# Patient Record
Sex: FEMALE | Race: WHITE | Employment: UNEMPLOYED
[De-identification: names, ages, dates, MRNs, and addresses within clinical notes are randomized per-mention and may not be internally consistent; named-entity substitution may affect disease eponyms.]

---

## 2017-03-01 ENCOUNTER — OFFICE VISIT (OUTPATIENT)
Dept: INTERNAL MEDICINE | Facility: CLINIC | Age: 72
End: 2017-03-01

## 2017-03-01 VITALS
DIASTOLIC BLOOD PRESSURE: 70 MMHG | WEIGHT: 197 LBS | BODY MASS INDEX: 38.68 KG/M2 | HEIGHT: 60 IN | SYSTOLIC BLOOD PRESSURE: 120 MMHG

## 2017-03-01 DIAGNOSIS — J01.10 ACUTE NON-RECURRENT FRONTAL SINUSITIS: Primary | ICD-10-CM

## 2017-03-01 PROCEDURE — 1123F ACP DISCUSS/DSCN MKR DOCD: CPT | Performed by: INTERNAL MEDICINE

## 2017-03-01 PROCEDURE — 3017F COLORECTAL CA SCREEN DOC REV: CPT | Performed by: INTERNAL MEDICINE

## 2017-03-01 PROCEDURE — 4040F PNEUMOC VAC/ADMIN/RCVD: CPT | Performed by: INTERNAL MEDICINE

## 2017-03-01 PROCEDURE — 99213 OFFICE O/P EST LOW 20 MIN: CPT | Performed by: INTERNAL MEDICINE

## 2017-03-01 PROCEDURE — 1036F TOBACCO NON-USER: CPT | Performed by: INTERNAL MEDICINE

## 2017-03-01 PROCEDURE — G8428 CUR MEDS NOT DOCUMENT: HCPCS | Performed by: INTERNAL MEDICINE

## 2017-03-01 PROCEDURE — 3014F SCREEN MAMMO DOC REV: CPT | Performed by: INTERNAL MEDICINE

## 2017-03-01 PROCEDURE — G8419 CALC BMI OUT NRM PARAM NOF/U: HCPCS | Performed by: INTERNAL MEDICINE

## 2017-03-01 PROCEDURE — G8399 PT W/DXA RESULTS DOCUMENT: HCPCS | Performed by: INTERNAL MEDICINE

## 2017-03-01 PROCEDURE — 1090F PRES/ABSN URINE INCON ASSESS: CPT | Performed by: INTERNAL MEDICINE

## 2017-03-01 PROCEDURE — G8484 FLU IMMUNIZE NO ADMIN: HCPCS | Performed by: INTERNAL MEDICINE

## 2017-03-01 RX ORDER — AZITHROMYCIN 250 MG/1
TABLET, FILM COATED ORAL
Qty: 8 TABLET | Refills: 0 | Status: SHIPPED | OUTPATIENT
Start: 2017-03-01 | End: 2017-12-05 | Stop reason: ALTCHOICE

## 2017-03-30 ENCOUNTER — HOSPITAL ENCOUNTER (OUTPATIENT)
Dept: WOMENS IMAGING | Age: 72
Discharge: HOME | End: 2017-03-30
Payer: MEDICARE

## 2017-03-30 DIAGNOSIS — Z12.31 VISIT FOR SCREENING MAMMOGRAM: ICD-10-CM

## 2017-03-30 PROCEDURE — 77063 BREAST TOMOSYNTHESIS BI: CPT

## 2017-05-30 ENCOUNTER — TELEPHONE (OUTPATIENT)
Dept: INTERNAL MEDICINE CLINIC | Age: 72
End: 2017-05-30

## 2017-05-30 ENCOUNTER — OFFICE VISIT (OUTPATIENT)
Dept: INTERNAL MEDICINE CLINIC | Age: 72
End: 2017-05-30
Payer: MEDICARE

## 2017-05-30 VITALS
DIASTOLIC BLOOD PRESSURE: 62 MMHG | RESPIRATION RATE: 14 BRPM | HEIGHT: 60 IN | WEIGHT: 193 LBS | SYSTOLIC BLOOD PRESSURE: 122 MMHG | BODY MASS INDEX: 37.89 KG/M2

## 2017-05-30 DIAGNOSIS — I11.9 HYPERTENSIVE HEART DISEASE WITHOUT HEART FAILURE: ICD-10-CM

## 2017-05-30 DIAGNOSIS — E55.9 VITAMIN D DEFICIENCY: ICD-10-CM

## 2017-05-30 DIAGNOSIS — M10.9 ACUTE GOUTY ARTHROPATHY: ICD-10-CM

## 2017-05-30 DIAGNOSIS — M17.0 PRIMARY OSTEOARTHRITIS OF BOTH KNEES: Primary | ICD-10-CM

## 2017-05-30 PROCEDURE — 1090F PRES/ABSN URINE INCON ASSESS: CPT | Performed by: INTERNAL MEDICINE

## 2017-05-30 PROCEDURE — G8417 CALC BMI ABV UP PARAM F/U: HCPCS | Performed by: INTERNAL MEDICINE

## 2017-05-30 PROCEDURE — G8427 DOCREV CUR MEDS BY ELIG CLIN: HCPCS | Performed by: INTERNAL MEDICINE

## 2017-05-30 PROCEDURE — 3014F SCREEN MAMMO DOC REV: CPT | Performed by: INTERNAL MEDICINE

## 2017-05-30 PROCEDURE — 4040F PNEUMOC VAC/ADMIN/RCVD: CPT | Performed by: INTERNAL MEDICINE

## 2017-05-30 PROCEDURE — 1036F TOBACCO NON-USER: CPT | Performed by: INTERNAL MEDICINE

## 2017-05-30 PROCEDURE — G8399 PT W/DXA RESULTS DOCUMENT: HCPCS | Performed by: INTERNAL MEDICINE

## 2017-05-30 PROCEDURE — 99214 OFFICE O/P EST MOD 30 MIN: CPT | Performed by: INTERNAL MEDICINE

## 2017-05-30 PROCEDURE — 3017F COLORECTAL CA SCREEN DOC REV: CPT | Performed by: INTERNAL MEDICINE

## 2017-05-30 PROCEDURE — 1123F ACP DISCUSS/DSCN MKR DOCD: CPT | Performed by: INTERNAL MEDICINE

## 2017-05-30 RX ORDER — MELOXICAM 7.5 MG/1
7.5 TABLET ORAL DAILY
Qty: 90 TABLET | Refills: 3 | Status: SHIPPED | OUTPATIENT
Start: 2017-05-30 | End: 2017-06-01 | Stop reason: ALTCHOICE

## 2017-05-30 RX ORDER — CLOTRIMAZOLE 1 %
CREAM (GRAM) TOPICAL
Qty: 100 G | Refills: 3 | Status: SHIPPED | OUTPATIENT
Start: 2017-05-30 | End: 2017-06-06

## 2017-05-30 RX ORDER — CELECOXIB 200 MG/1
200 CAPSULE ORAL DAILY
Qty: 90 CAPSULE | Refills: 3 | Status: SHIPPED | OUTPATIENT
Start: 2017-05-30 | End: 2017-05-30 | Stop reason: ALTCHOICE

## 2017-05-30 ASSESSMENT — PATIENT HEALTH QUESTIONNAIRE - PHQ9
SUM OF ALL RESPONSES TO PHQ9 QUESTIONS 1 & 2: 0
SUM OF ALL RESPONSES TO PHQ QUESTIONS 1-9: 0
1. LITTLE INTEREST OR PLEASURE IN DOING THINGS: 0
2. FEELING DOWN, DEPRESSED OR HOPELESS: 0

## 2017-05-30 ASSESSMENT — ENCOUNTER SYMPTOMS
COUGH: 0
CHEST TIGHTNESS: 0

## 2017-06-01 ENCOUNTER — TELEPHONE (OUTPATIENT)
Dept: INTERNAL MEDICINE CLINIC | Age: 72
End: 2017-06-01

## 2017-06-01 DIAGNOSIS — M17.0 PRIMARY OSTEOARTHRITIS OF BOTH KNEES: Primary | ICD-10-CM

## 2017-06-01 RX ORDER — MELOXICAM 7.5 MG/1
7.5 TABLET ORAL DAILY
Qty: 90 TABLET | Refills: 3 | Status: SHIPPED | OUTPATIENT
Start: 2017-06-01 | End: 2017-06-01 | Stop reason: SDUPTHER

## 2017-06-01 RX ORDER — MELOXICAM 7.5 MG
7.5 TABLET ORAL DAILY
Qty: 90 TABLET | Refills: 3 | Status: SHIPPED | OUTPATIENT
Start: 2017-06-01 | End: 2017-06-02 | Stop reason: SDUPTHER

## 2017-06-02 DIAGNOSIS — M17.0 PRIMARY OSTEOARTHRITIS OF BOTH KNEES: ICD-10-CM

## 2017-06-05 RX ORDER — MELOXICAM 7.5 MG
7.5 TABLET ORAL DAILY
Qty: 30 TABLET | Refills: 0 | Status: SHIPPED | OUTPATIENT
Start: 2017-06-05 | End: 2017-12-05 | Stop reason: ALTCHOICE

## 2017-09-14 RX ORDER — CELECOXIB 200 MG/1
CAPSULE ORAL
Qty: 30 CAPSULE | Refills: 10 | Status: SHIPPED | OUTPATIENT
Start: 2017-09-14 | End: 2018-06-11 | Stop reason: SDUPTHER

## 2017-12-05 ENCOUNTER — HOSPITAL ENCOUNTER (OUTPATIENT)
Facility: CLINIC | Age: 72
Discharge: HOME | End: 2017-12-05
Payer: MEDICARE

## 2017-12-05 ENCOUNTER — OFFICE VISIT (OUTPATIENT)
Dept: INTERNAL MEDICINE CLINIC | Age: 72
End: 2017-12-05
Payer: MEDICARE

## 2017-12-05 ENCOUNTER — HOSPITAL ENCOUNTER (OUTPATIENT)
Dept: GENERAL RADIOLOGY | Facility: CLINIC | Age: 72
Discharge: HOME | End: 2017-12-05
Payer: MEDICARE

## 2017-12-05 VITALS
SYSTOLIC BLOOD PRESSURE: 122 MMHG | WEIGHT: 192.9 LBS | HEIGHT: 60 IN | BODY MASS INDEX: 37.87 KG/M2 | DIASTOLIC BLOOD PRESSURE: 74 MMHG

## 2017-12-05 DIAGNOSIS — M54.6 ACUTE MIDLINE THORACIC BACK PAIN: ICD-10-CM

## 2017-12-05 DIAGNOSIS — Z12.11 COLON CANCER SCREENING: ICD-10-CM

## 2017-12-05 DIAGNOSIS — M54.6 ACUTE MIDLINE THORACIC BACK PAIN: Primary | ICD-10-CM

## 2017-12-05 PROCEDURE — G8399 PT W/DXA RESULTS DOCUMENT: HCPCS | Performed by: INTERNAL MEDICINE

## 2017-12-05 PROCEDURE — 1123F ACP DISCUSS/DSCN MKR DOCD: CPT | Performed by: INTERNAL MEDICINE

## 2017-12-05 PROCEDURE — 96372 THER/PROPH/DIAG INJ SC/IM: CPT | Performed by: INTERNAL MEDICINE

## 2017-12-05 PROCEDURE — G8417 CALC BMI ABV UP PARAM F/U: HCPCS | Performed by: INTERNAL MEDICINE

## 2017-12-05 PROCEDURE — 1090F PRES/ABSN URINE INCON ASSESS: CPT | Performed by: INTERNAL MEDICINE

## 2017-12-05 PROCEDURE — G8427 DOCREV CUR MEDS BY ELIG CLIN: HCPCS | Performed by: INTERNAL MEDICINE

## 2017-12-05 PROCEDURE — G8484 FLU IMMUNIZE NO ADMIN: HCPCS | Performed by: INTERNAL MEDICINE

## 2017-12-05 PROCEDURE — 99213 OFFICE O/P EST LOW 20 MIN: CPT | Performed by: INTERNAL MEDICINE

## 2017-12-05 PROCEDURE — 3014F SCREEN MAMMO DOC REV: CPT | Performed by: INTERNAL MEDICINE

## 2017-12-05 PROCEDURE — 1036F TOBACCO NON-USER: CPT | Performed by: INTERNAL MEDICINE

## 2017-12-05 PROCEDURE — 4040F PNEUMOC VAC/ADMIN/RCVD: CPT | Performed by: INTERNAL MEDICINE

## 2017-12-05 PROCEDURE — 3017F COLORECTAL CA SCREEN DOC REV: CPT | Performed by: INTERNAL MEDICINE

## 2017-12-05 PROCEDURE — 72072 X-RAY EXAM THORAC SPINE 3VWS: CPT

## 2017-12-05 RX ORDER — HYDROCODONE BITARTRATE AND ACETAMINOPHEN 5; 325 MG/1; MG/1
1 TABLET ORAL EVERY 8 HOURS PRN
Qty: 15 TABLET | Refills: 0 | Status: SHIPPED | OUTPATIENT
Start: 2017-12-05 | End: 2017-12-10

## 2017-12-05 NOTE — PROGRESS NOTES
Subjective:      Patient ID: Deann Gutierrez is a 81 y.o. female. Visit Information    Have you changed or started any medications since your last visit including any over-the-counter medicines, vitamins, or herbal medicines? no   Are you having any side effects from any of your medications? -  no  Have you stopped taking any of your medications? Is so, why? -  no    Have you seen any other physician or provider since your last visit? No  Have you had any other diagnostic tests since your last visit? No  Have you been seen in the emergency room and/or had an admission to a hospital since we last saw you? No  Have you had your routine dental cleaning in the past 6 months? no    Have you activated your [employer] account? If not, what are your barriers? Yes     Patient Care Team:  Manas France MD as PCP - Ara Amador MD as PCP - [facility] Attributed Provider    Medical History Review  Past Medical, Family, and Social History reviewed and does contribute to the patient presenting condition    Health Maintenance   Topic Date Due    Hepatitis C screen  1945    DTaP/Tdap/Td vaccine (1 - Tdap) 05/24/1966    Diabetes screen  05/24/1987    Zostavax vaccine  05/24/2007    Colon Cancer Screen FIT/FOBT  02/17/2013    Flu vaccine (1) 09/01/2017    Breast cancer screen  03/30/2019    Lipid screen  06/18/2020    DEXA (modify frequency per FRAX score)  Completed    Pneumococcal low/med risk  Completed       HPI     Chief Complaint   Patient presents with    Shoulder Pain     c/o right shoulder pain x 3 days.  pt states she \"overworked herself\"      Pt report pain upper thoracic area below scapula   Has been cooking for more than 20 people     Has point tenderness mid thoracic area     Review of Systems   Past Medical History:   Diagnosis Date    Acute gouty arthropathy     Arthritis     Body mass index 36.0-36.9, adult     CAD (coronary artery disease)     Hypertension     hx of    Other abnormal glucose     Other specified urticaria     Unspecified asthma(493.90)     Unspecified hypertensive heart disease without heart failure     Unspecified vitamin D deficiency     Wears glasses      Social History   Substance Use Topics    Smoking status: Never Smoker    Smokeless tobacco: Never Used    Alcohol use No       ROS  CVS no chest pain no sob no orthopnea  Resp no cough   General no weight loss no fatigue no fever        Objective:   Physical Exam     Blood pressure 122/74, height 4' 11.84\" (1.52 m), weight 192 lb 14.4 oz (87.5 kg), not currently breastfeeding. General Appearance NAD conversant  Neck FROM supple no JVD  Lungs normal effort Clear to auscultation  Cardio regular rhythm no murmur  Abdomen Soft nontender no HSM  Ext no edema no cyanosis no clubbing   Skin no rashes no ulcers  Neuro no focal deficits   Musculoskeletal mid thoracic  spinal tenderness no kyphosis       Assessment:      1. Acute midline thoracic back pain  HYDROcodone-acetaminophen (NORCO) 5-325 MG per tablet    XR THORACIC SPINE (2 VIEWS)    ketorolac (TORADOL) injection 30 mg   2.  Colon cancer screening  POCT Fecal Immunochemical Test (FIT)         Plan:      Musculoskeletal pain   Short course of opiates with NSAID

## 2018-03-05 ENCOUNTER — OFFICE VISIT (OUTPATIENT)
Dept: INTERNAL MEDICINE CLINIC | Age: 73
End: 2018-03-05
Payer: MEDICARE

## 2018-03-05 VITALS
SYSTOLIC BLOOD PRESSURE: 114 MMHG | WEIGHT: 190 LBS | BODY MASS INDEX: 37.3 KG/M2 | DIASTOLIC BLOOD PRESSURE: 66 MMHG | HEIGHT: 60 IN

## 2018-03-05 DIAGNOSIS — J06.9 URI, ACUTE: Primary | ICD-10-CM

## 2018-03-05 DIAGNOSIS — Z12.11 COLON CANCER SCREENING: ICD-10-CM

## 2018-03-05 PROCEDURE — 99213 OFFICE O/P EST LOW 20 MIN: CPT | Performed by: INTERNAL MEDICINE

## 2018-03-05 RX ORDER — PROMETHAZINE HYDROCHLORIDE AND CODEINE PHOSPHATE 6.25; 1 MG/5ML; MG/5ML
5 SYRUP ORAL 4 TIMES DAILY PRN
Qty: 80 ML | Refills: 0 | Status: SHIPPED | OUTPATIENT
Start: 2018-03-05 | End: 2018-03-10

## 2018-03-05 RX ORDER — AZITHROMYCIN 250 MG/1
TABLET, FILM COATED ORAL
Qty: 1 PACKET | Refills: 0 | Status: SHIPPED | OUTPATIENT
Start: 2018-03-05 | End: 2018-03-14 | Stop reason: ALTCHOICE

## 2018-03-05 NOTE — PROGRESS NOTES
Subjective:      Patient ID: Carmen Persaud is a 81 y.o. female. Chronic Disease Visit Information    BP Readings from Last 3 Encounters:   03/05/18 114/66   12/05/17 122/74   05/30/17 122/62          LDL Cholesterol (mg/dL)   Date Value   06/18/2015 162 (H)     HDL (mg/dL)   Date Value   06/18/2015 59     BUN (mg/dL)   Date Value   07/01/2016 22     CREATININE (mg/dL)   Date Value   07/01/2016 0.54     Glucose (mg/dL)   Date Value   07/01/2016 110 (H)   02/15/2012 112 (H)            Have you changed or started any medications since your last visit including any over-the-counter medicines, vitamins, or herbal medicines? no   Are you having any side effects from any of your medications? -  no  Have you stopped taking any of your medications? Is so, why? -  no    Have you seen any other physician or provider since your last visit? No  Have you had any other diagnostic tests since your last visit? No  Have you been seen in the emergency room and/or had an admission to a hospital since we last saw you? No  Have you had your annual diabetic retinal (eye) exam? No  Have you had your routine dental cleaning in the past 6 months? no    Have you activated your [employer] account? If not, what are your barriers?  Yes     Patient Care Team:  Dejuan Mae MD as PCP - General  Zhou Toney MD as PCP - S Attributed Provider         Medical History Review  Past Medical, Family, and Social History reviewed and does contribute to the patient presenting condition    Health Maintenance   Topic Date Due    Hepatitis C screen  1945    Diabetes screen  05/24/1987    Shingles Vaccine (1 of 2 - 2 Dose Series) 05/24/1997    Colon Cancer Screen FIT/FOBT  02/17/2013    DTaP/Tdap/Td vaccine (1 - Tdap) 12/13/2018 (Originally 5/24/1966)    Breast cancer screen  03/30/2019    Lipid screen  06/18/2020    DEXA (modify frequency per FRAX score)  Completed    Flu vaccine  Completed    Pneumococcal low/med risk  Completed HPI   Chief Complaint   Patient presents with    Cough     Productive cough, dry mouth, sore throat x 1 week. Cough dry sorethroat   No ear pain   Nasal congestion   Chills +   No fever     Review of Systems   Past Medical History:   Diagnosis Date    Acute gouty arthropathy     Arthritis     Body mass index 36.0-36.9, adult     CAD (coronary artery disease)     Hypertension     hx of    Other abnormal glucose     Other specified urticaria     Unspecified asthma(493.90)     Unspecified hypertensive heart disease without heart failure     Unspecified vitamin D deficiency     Wears glasses      Social History   Substance Use Topics    Smoking status: Never Smoker    Smokeless tobacco: Never Used    Alcohol use No       ROS  CVS no chest pain no sob no orthopnea  Resp + cough   General no weight loss no fatigue no fever        Objective:   Physical Exam    Blood pressure 114/66, height 4' 11.84\" (1.52 m), weight 190 lb (86.2 kg), not currently breastfeeding. General Appearance NAD conversant  Neck FROM supple no sinsu tenderness  + postnasal drip   Lungs normal effort Clear to auscultation  Cardio regular rhythm no murmur  Abdomen Soft nontender no HSM  Ext no edema no cyanosis no clubbing   Skin no rashes no ulcers  Neuro no focal deficits   Musculoskeletal no spinal tenderness no kyphosis     Assessment:      1. URI, acute  azithromycin (ZITHROMAX) 250 MG tablet    promethazine-codeine (PHENERGAN WITH CODEINE) 6.25-10 MG/5ML syrup   2.  Colon cancer screening  POCT Fecal Immunochemical Test (FIT)         Plan:

## 2018-03-14 ENCOUNTER — HOSPITAL ENCOUNTER (OUTPATIENT)
Age: 73
Setting detail: SPECIMEN
Discharge: HOME | End: 2018-03-14
Payer: MEDICARE

## 2018-03-14 ENCOUNTER — OFFICE VISIT (OUTPATIENT)
Dept: INTERNAL MEDICINE CLINIC | Age: 73
End: 2018-03-14
Payer: MEDICARE

## 2018-03-14 VITALS
DIASTOLIC BLOOD PRESSURE: 60 MMHG | HEART RATE: 66 BPM | SYSTOLIC BLOOD PRESSURE: 122 MMHG | OXYGEN SATURATION: 98 % | WEIGHT: 191 LBS | HEIGHT: 60 IN | BODY MASS INDEX: 37.5 KG/M2

## 2018-03-14 DIAGNOSIS — M94.9 DISORDER OF CARTILAGE: ICD-10-CM

## 2018-03-14 DIAGNOSIS — Z00.00 WELL ADULT EXAM: ICD-10-CM

## 2018-03-14 DIAGNOSIS — Z12.11 SCREENING FOR COLON CANCER: ICD-10-CM

## 2018-03-14 DIAGNOSIS — Z00.00 WELL ADULT EXAM: Primary | ICD-10-CM

## 2018-03-14 DIAGNOSIS — J01.00 ACUTE MAXILLARY SINUSITIS, RECURRENCE NOT SPECIFIED: ICD-10-CM

## 2018-03-14 LAB
-: ABNORMAL
ABSOLUTE EOS #: 0.33 K/UL (ref 0–0.44)
ABSOLUTE IMMATURE GRANULOCYTE: 0.05 K/UL (ref 0–0.3)
ABSOLUTE LYMPH #: 3.91 K/UL (ref 1.1–3.7)
ABSOLUTE MONO #: 0.69 K/UL (ref 0.1–1.2)
ALBUMIN SERPL-MCNC: 4.2 G/DL (ref 3.5–5.2)
ALBUMIN/GLOBULIN RATIO: 1.4 (ref 1–2.5)
ALP BLD-CCNC: 54 U/L (ref 35–104)
ALT SERPL-CCNC: 15 U/L (ref 5–33)
AMORPHOUS: ABNORMAL
ANION GAP SERPL CALCULATED.3IONS-SCNC: 12 MMOL/L (ref 9–17)
AST SERPL-CCNC: 20 U/L
BACTERIA: ABNORMAL
BASOPHILS # BLD: 1 % (ref 0–2)
BASOPHILS ABSOLUTE: 0.06 K/UL (ref 0–0.2)
BILIRUB SERPL-MCNC: 0.21 MG/DL (ref 0.3–1.2)
BILIRUBIN URINE: NEGATIVE
BUN BLDV-MCNC: 21 MG/DL (ref 8–23)
BUN/CREAT BLD: ABNORMAL (ref 9–20)
CALCIUM SERPL-MCNC: 9.3 MG/DL (ref 8.6–10.4)
CASTS UA: ABNORMAL /LPF (ref 0–8)
CHLORIDE BLD-SCNC: 98 MMOL/L (ref 98–107)
CO2: 28 MMOL/L (ref 20–31)
COLOR: YELLOW
CREAT SERPL-MCNC: 0.66 MG/DL (ref 0.5–0.9)
CRYSTALS, UA: ABNORMAL /HPF
DIFFERENTIAL TYPE: ABNORMAL
EOSINOPHILS RELATIVE PERCENT: 3 % (ref 1–4)
EPITHELIAL CELLS UA: ABNORMAL /HPF (ref 0–5)
GFR AFRICAN AMERICAN: >60 ML/MIN
GFR NON-AFRICAN AMERICAN: >60 ML/MIN
GFR SERPL CREATININE-BSD FRML MDRD: ABNORMAL ML/MIN/{1.73_M2}
GFR SERPL CREATININE-BSD FRML MDRD: ABNORMAL ML/MIN/{1.73_M2}
GLUCOSE BLD-MCNC: 109 MG/DL (ref 70–99)
GLUCOSE URINE: NEGATIVE
HCT VFR BLD CALC: 43.9 % (ref 36.3–47.1)
HEMOGLOBIN: 13.4 G/DL (ref 11.9–15.1)
IMMATURE GRANULOCYTES: 1 %
KETONES, URINE: NEGATIVE
LEUKOCYTE ESTERASE, URINE: ABNORMAL
LYMPHOCYTES # BLD: 37 % (ref 24–43)
MCH RBC QN AUTO: 26.2 PG (ref 25.2–33.5)
MCHC RBC AUTO-ENTMCNC: 30.5 G/DL (ref 28.4–34.8)
MCV RBC AUTO: 85.9 FL (ref 82.6–102.9)
MONOCYTES # BLD: 7 % (ref 3–12)
MUCUS: ABNORMAL
NITRITE, URINE: NEGATIVE
NRBC AUTOMATED: 0 PER 100 WBC
OTHER OBSERVATIONS UA: ABNORMAL
PDW BLD-RTO: 13.9 % (ref 11.8–14.4)
PH UA: 5 (ref 5–8)
PLATELET # BLD: 282 K/UL (ref 138–453)
PLATELET ESTIMATE: ABNORMAL
PMV BLD AUTO: 10.6 FL (ref 8.1–13.5)
POTASSIUM SERPL-SCNC: 4.2 MMOL/L (ref 3.7–5.3)
PROTEIN UA: NEGATIVE
RBC # BLD: 5.11 M/UL (ref 3.95–5.11)
RBC # BLD: ABNORMAL 10*6/UL
RBC UA: ABNORMAL /HPF (ref 0–4)
RENAL EPITHELIAL, UA: ABNORMAL /HPF
SEDIMENTATION RATE, ERYTHROCYTE: 18 MM (ref 0–20)
SEG NEUTROPHILS: 51 % (ref 36–65)
SEGMENTED NEUTROPHILS ABSOLUTE COUNT: 5.59 K/UL (ref 1.5–8.1)
SODIUM BLD-SCNC: 138 MMOL/L (ref 135–144)
SPECIFIC GRAVITY UA: 1.02 (ref 1–1.03)
TOTAL PROTEIN: 7.3 G/DL (ref 6.4–8.3)
TRICHOMONAS: ABNORMAL
TSH SERPL DL<=0.05 MIU/L-ACNC: 2.39 MIU/L (ref 0.3–5)
TURBIDITY: ABNORMAL
URINE HGB: NEGATIVE
UROBILINOGEN, URINE: NORMAL
VITAMIN B-12: 777 PG/ML (ref 232–1245)
VITAMIN D 25-HYDROXY: 49.3 NG/ML (ref 30–100)
WBC # BLD: 10.6 K/UL (ref 3.5–11.3)
WBC # BLD: ABNORMAL 10*3/UL
WBC UA: ABNORMAL /HPF (ref 0–5)
YEAST: ABNORMAL

## 2018-03-14 PROCEDURE — 3288F FALL RISK ASSESSMENT DOCD: CPT | Performed by: INTERNAL MEDICINE

## 2018-03-14 PROCEDURE — 99397 PER PM REEVAL EST PAT 65+ YR: CPT | Performed by: INTERNAL MEDICINE

## 2018-03-14 PROCEDURE — G8510 SCR DEP NEG, NO PLAN REQD: HCPCS | Performed by: INTERNAL MEDICINE

## 2018-03-14 RX ORDER — AZITHROMYCIN 250 MG/1
TABLET, FILM COATED ORAL
Qty: 1 PACKET | Refills: 0 | Status: SHIPPED | OUTPATIENT
Start: 2018-03-14 | End: 2018-10-24

## 2018-03-14 ASSESSMENT — LIFESTYLE VARIABLES: HOW OFTEN DO YOU HAVE A DRINK CONTAINING ALCOHOL: 0

## 2018-03-14 ASSESSMENT — ENCOUNTER SYMPTOMS
COUGH: 0
CHEST TIGHTNESS: 0

## 2018-03-14 ASSESSMENT — ANXIETY QUESTIONNAIRES: GAD7 TOTAL SCORE: 0

## 2018-03-14 ASSESSMENT — PATIENT HEALTH QUESTIONNAIRE - PHQ9: SUM OF ALL RESPONSES TO PHQ QUESTIONS 1-9: 0

## 2018-04-13 PROBLEM — Z00.00 WELL ADULT EXAM: Status: RESOLVED | Noted: 2018-03-14 | Resolved: 2018-04-13

## 2018-06-11 RX ORDER — CELECOXIB 200 MG/1
CAPSULE ORAL
Qty: 30 CAPSULE | Refills: 9 | Status: CANCELLED | OUTPATIENT
Start: 2018-06-11

## 2018-06-11 RX ORDER — CELECOXIB 200 MG/1
200 CAPSULE ORAL DAILY
Qty: 90 CAPSULE | Refills: 3 | Status: SHIPPED | OUTPATIENT
Start: 2018-06-11 | End: 2018-11-13 | Stop reason: CLARIF

## 2018-10-24 ENCOUNTER — TELEPHONE (OUTPATIENT)
Dept: PHARMACY | Facility: CLINIC | Age: 73
End: 2018-10-24

## 2018-10-24 DIAGNOSIS — Z79.899 ENCOUNTER FOR MEDICATION REVIEW: Primary | ICD-10-CM

## 2018-10-24 PROCEDURE — 1111F DSCHRG MED/CURRENT MED MERGE: CPT | Performed by: PHARMACIST

## 2018-10-24 RX ORDER — ACETAMINOPHEN 325 MG/1
650 TABLET ORAL EVERY 6 HOURS PRN
COMMUNITY
End: 2020-08-18 | Stop reason: ALTCHOICE

## 2018-10-24 RX ORDER — CLINDAMYCIN HYDROCHLORIDE 300 MG/1
1 CAPSULE ORAL 3 TIMES DAILY
COMMUNITY
Start: 2018-10-25 | End: 2018-11-02

## 2019-03-08 ENCOUNTER — TELEPHONE (OUTPATIENT)
Dept: INTERNAL MEDICINE CLINIC | Age: 74
End: 2019-03-08

## 2019-03-13 ENCOUNTER — OFFICE VISIT (OUTPATIENT)
Dept: FAMILY MEDICINE CLINIC | Age: 74
End: 2019-03-13
Payer: MEDICARE

## 2019-03-13 VITALS
RESPIRATION RATE: 16 BRPM | BODY MASS INDEX: 39.07 KG/M2 | SYSTOLIC BLOOD PRESSURE: 124 MMHG | DIASTOLIC BLOOD PRESSURE: 75 MMHG | HEART RATE: 70 BPM | OXYGEN SATURATION: 96 % | HEIGHT: 60 IN | TEMPERATURE: 97.8 F | WEIGHT: 199 LBS

## 2019-03-13 DIAGNOSIS — M19.90 ARTHRITIS: Primary | ICD-10-CM

## 2019-03-13 DIAGNOSIS — R60.9 EDEMA, UNSPECIFIED TYPE: ICD-10-CM

## 2019-03-13 PROCEDURE — 99213 OFFICE O/P EST LOW 20 MIN: CPT | Performed by: FAMILY MEDICINE

## 2019-03-13 PROCEDURE — G8510 SCR DEP NEG, NO PLAN REQD: HCPCS | Performed by: FAMILY MEDICINE

## 2019-03-13 RX ORDER — PREDNISONE 10 MG/1
10 TABLET ORAL DAILY
Qty: 10 TABLET | Refills: 0 | Status: SHIPPED | OUTPATIENT
Start: 2019-03-13 | End: 2019-03-23

## 2019-03-13 RX ORDER — CELECOXIB 200 MG/1
200 CAPSULE ORAL 2 TIMES DAILY
Qty: 180 CAPSULE | Refills: 1 | Status: SHIPPED | OUTPATIENT
Start: 2019-03-13 | End: 2020-08-18 | Stop reason: ALTCHOICE

## 2019-03-13 ASSESSMENT — PATIENT HEALTH QUESTIONNAIRE - PHQ9
1. LITTLE INTEREST OR PLEASURE IN DOING THINGS: 0
SUM OF ALL RESPONSES TO PHQ QUESTIONS 1-9: 0
SUM OF ALL RESPONSES TO PHQ9 QUESTIONS 1 & 2: 0
SUM OF ALL RESPONSES TO PHQ QUESTIONS 1-9: 0
2. FEELING DOWN, DEPRESSED OR HOPELESS: 0

## 2019-03-13 ASSESSMENT — ENCOUNTER SYMPTOMS
EYES NEGATIVE: 1
GASTROINTESTINAL NEGATIVE: 1
ALLERGIC/IMMUNOLOGIC NEGATIVE: 1
RESPIRATORY NEGATIVE: 1

## 2019-03-27 ENCOUNTER — HOSPITAL ENCOUNTER (OUTPATIENT)
Dept: WOMENS IMAGING | Age: 74
Discharge: HOME | End: 2019-03-29
Payer: MEDICARE

## 2019-03-27 DIAGNOSIS — Z12.31 SCREENING MAMMOGRAM, ENCOUNTER FOR: ICD-10-CM

## 2019-03-27 PROCEDURE — 77063 BREAST TOMOSYNTHESIS BI: CPT

## 2019-11-15 ENCOUNTER — HOSPITAL ENCOUNTER (OUTPATIENT)
Age: 74
Discharge: HOME | End: 2019-11-15
Payer: MEDICARE

## 2019-11-15 DIAGNOSIS — Z13.220 SCREENING, LIPID: ICD-10-CM

## 2019-11-15 DIAGNOSIS — Z13.0 SCREENING, ANEMIA, DEFICIENCY, IRON: ICD-10-CM

## 2019-11-15 DIAGNOSIS — Z13.1 SCREENING FOR DIABETES MELLITUS: ICD-10-CM

## 2019-11-15 LAB
ABSOLUTE EOS #: 0.3 K/UL (ref 0–0.4)
ABSOLUTE IMMATURE GRANULOCYTE: NORMAL K/UL (ref 0–0.3)
ABSOLUTE LYMPH #: 3.3 K/UL (ref 1–4.8)
ABSOLUTE MONO #: 0.5 K/UL (ref 0.1–1.3)
ALBUMIN SERPL-MCNC: 4.3 G/DL (ref 3.5–5.2)
ALBUMIN/GLOBULIN RATIO: ABNORMAL (ref 1–2.5)
ALP BLD-CCNC: 60 U/L (ref 35–104)
ALT SERPL-CCNC: 12 U/L (ref 5–33)
ANION GAP SERPL CALCULATED.3IONS-SCNC: 13 MMOL/L (ref 9–17)
AST SERPL-CCNC: 17 U/L
BASOPHILS # BLD: 1 % (ref 0–2)
BASOPHILS ABSOLUTE: 0.1 K/UL (ref 0–0.2)
BILIRUB SERPL-MCNC: 0.34 MG/DL (ref 0.3–1.2)
BUN BLDV-MCNC: 17 MG/DL (ref 8–23)
BUN/CREAT BLD: ABNORMAL (ref 9–20)
CALCIUM SERPL-MCNC: 9.9 MG/DL (ref 8.6–10.4)
CHLORIDE BLD-SCNC: 104 MMOL/L (ref 98–107)
CHOLESTEROL/HDL RATIO: 4.4
CHOLESTEROL: 243 MG/DL
CO2: 26 MMOL/L (ref 20–31)
CREAT SERPL-MCNC: 0.68 MG/DL (ref 0.5–0.9)
DIFFERENTIAL TYPE: NORMAL
EOSINOPHILS RELATIVE PERCENT: 4 % (ref 0–4)
GFR AFRICAN AMERICAN: >60 ML/MIN
GFR NON-AFRICAN AMERICAN: >60 ML/MIN
GFR SERPL CREATININE-BSD FRML MDRD: ABNORMAL ML/MIN/{1.73_M2}
GFR SERPL CREATININE-BSD FRML MDRD: ABNORMAL ML/MIN/{1.73_M2}
GLUCOSE BLD-MCNC: 101 MG/DL (ref 70–99)
HCT VFR BLD CALC: 40.9 % (ref 36–46)
HDLC SERPL-MCNC: 55 MG/DL
HEMOGLOBIN: 13.4 G/DL (ref 12–16)
IMMATURE GRANULOCYTES: NORMAL %
LDL CHOLESTEROL: 173 MG/DL (ref 0–130)
LYMPHOCYTES # BLD: 41 % (ref 24–44)
MCH RBC QN AUTO: 26.3 PG (ref 26–34)
MCHC RBC AUTO-ENTMCNC: 32.7 G/DL (ref 31–37)
MCV RBC AUTO: 80.5 FL (ref 80–100)
MONOCYTES # BLD: 6 % (ref 1–7)
NRBC AUTOMATED: NORMAL PER 100 WBC
PDW BLD-RTO: 14.8 % (ref 11.5–14.9)
PLATELET # BLD: 281 K/UL (ref 150–450)
PLATELET ESTIMATE: NORMAL
PMV BLD AUTO: 7.7 FL (ref 6–12)
POTASSIUM SERPL-SCNC: 4.5 MMOL/L (ref 3.7–5.3)
RBC # BLD: 5.08 M/UL (ref 4–5.2)
RBC # BLD: NORMAL 10*6/UL
SEG NEUTROPHILS: 48 % (ref 36–66)
SEGMENTED NEUTROPHILS ABSOLUTE COUNT: 4 K/UL (ref 1.3–9.1)
SODIUM BLD-SCNC: 143 MMOL/L (ref 135–144)
TOTAL PROTEIN: 7.7 G/DL (ref 6.4–8.3)
TRIGL SERPL-MCNC: 76 MG/DL
VLDLC SERPL CALC-MCNC: ABNORMAL MG/DL (ref 1–30)
WBC # BLD: 8.2 K/UL (ref 3.5–11)
WBC # BLD: NORMAL 10*3/UL

## 2019-11-15 PROCEDURE — 80053 COMPREHEN METABOLIC PANEL: CPT

## 2019-11-15 PROCEDURE — 36415 COLL VENOUS BLD VENIPUNCTURE: CPT

## 2019-11-15 PROCEDURE — 80061 LIPID PANEL: CPT

## 2019-11-15 PROCEDURE — 85025 COMPLETE CBC W/AUTO DIFF WBC: CPT

## 2020-08-25 ENCOUNTER — HOSPITAL ENCOUNTER (EMERGENCY)
Age: 75
Discharge: HOME | End: 2020-08-25
Attending: EMERGENCY MEDICINE
Payer: MEDICARE

## 2020-08-25 ENCOUNTER — APPOINTMENT (OUTPATIENT)
Dept: GENERAL RADIOLOGY | Age: 75
End: 2020-08-25
Payer: MEDICARE

## 2020-08-25 VITALS
BODY MASS INDEX: 35.88 KG/M2 | TEMPERATURE: 97.7 F | SYSTOLIC BLOOD PRESSURE: 158 MMHG | HEIGHT: 62 IN | DIASTOLIC BLOOD PRESSURE: 76 MMHG | OXYGEN SATURATION: 98 % | HEART RATE: 59 BPM | WEIGHT: 195 LBS | RESPIRATION RATE: 18 BRPM

## 2020-08-25 PROCEDURE — 73030 X-RAY EXAM OF SHOULDER: CPT

## 2020-08-25 PROCEDURE — 6370000000 HC RX 637 (ALT 250 FOR IP): Performed by: EMERGENCY MEDICINE

## 2020-08-25 PROCEDURE — 99283 EMERGENCY DEPT VISIT LOW MDM: CPT

## 2020-08-25 PROCEDURE — 71046 X-RAY EXAM CHEST 2 VIEWS: CPT

## 2020-08-25 RX ORDER — TIZANIDINE 2 MG/1
2 TABLET ORAL EVERY 6 HOURS PRN
Qty: 30 TABLET | Refills: 0 | Status: SHIPPED | OUTPATIENT
Start: 2020-08-25 | End: 2020-09-24

## 2020-08-25 RX ORDER — CYCLOBENZAPRINE HCL 10 MG
10 TABLET ORAL ONCE
Status: COMPLETED | OUTPATIENT
Start: 2020-08-25 | End: 2020-08-25

## 2020-08-25 RX ORDER — LIDOCAINE 50 MG/G
1 PATCH TOPICAL DAILY
Qty: 7 PATCH | Refills: 0 | Status: SHIPPED | OUTPATIENT
Start: 2020-08-25 | End: 2020-09-01

## 2020-08-25 RX ADMIN — CYCLOBENZAPRINE 10 MG: 10 TABLET, FILM COATED ORAL at 09:26

## 2020-08-25 ASSESSMENT — ENCOUNTER SYMPTOMS
BLOOD IN STOOL: 0
NAUSEA: 0
ABDOMINAL PAIN: 0
SHORTNESS OF BREATH: 0
VOMITING: 0
BACK PAIN: 0
COLOR CHANGE: 0
DIARRHEA: 0
TROUBLE SWALLOWING: 0
CONSTIPATION: 0
COUGH: 0
SORE THROAT: 0

## 2020-08-25 ASSESSMENT — PAIN SCALES - GENERAL: PAINLEVEL_OUTOF10: 8

## 2020-08-25 ASSESSMENT — PAIN DESCRIPTION - LOCATION: LOCATION: BACK;SHOULDER

## 2020-08-25 ASSESSMENT — PAIN DESCRIPTION - PAIN TYPE: TYPE: ACUTE PAIN

## 2020-08-25 ASSESSMENT — PAIN DESCRIPTION - ORIENTATION: ORIENTATION: RIGHT

## 2020-08-25 NOTE — ED PROVIDER NOTES
[address] ED  eMERGENCY dEPARTMENT eNCOUnter    Pt Name: Deysi Koehler  MRN: [id number]  YOB: 1945  Date of evaluation:8/25/20  PCP: Sam Freeman MD    CHIEF COMPLAINT       Chief Complaint   Patient presents with    Shoulder Pain    Back Pain       HISTORY OF PRESENT ILLNESS    Aster Sequeira is a 70 y.o. female who presents with a chief complaint of posterior right shoulder and right upper back pain. Patient states her pain is been going on for the past 7 or 8 days. States she saw her PCP and was told that she has arthritis. States she has had remote imaging of the shoulder and was told that there was arthritis. She has been taking Mobic with minimal relief. Denies any chest pain. Denies any neck pain. No numbness, tingling, weakness in her extremities. No recent fevers, chills other illnesses. No shortness of breath, cough. Denies any falls or trauma. Symptoms are acute on chronic. Symptoms are moderate. Nothing make symptoms better or worse. Patient has no other complaints at this time. REVIEW OF SYSTEMS       Review of Systems   Constitutional: Negative for chills, fatigue and fever. HENT: Negative for congestion, ear pain, sore throat and trouble swallowing. Eyes: Negative for visual disturbance. Respiratory: Negative for cough and shortness of breath. Cardiovascular: Negative for chest pain, palpitations and leg swelling. Gastrointestinal: Negative for abdominal pain, blood in stool, constipation, diarrhea, nausea and vomiting. Genitourinary: Negative for dysuria and flank pain. Musculoskeletal: Positive for arthralgias. Negative for back pain, myalgias and neck pain. Skin: Negative for color change, rash and wound. Neurological: Negative for dizziness, weakness, light-headedness, numbness and headaches. Psychiatric/Behavioral: Negative for confusion. All other systems reviewed and are negative.     Negativein 10 essential Systems except as mentioned above and in the HPI. PAST MEDICAL HISTORY     Past Medical History:   Diagnosis Date    Acute gouty arthropathy     Arthritis     Body mass index 36.0-36.9, adult     CAD (coronary artery disease)     Hypertension     hx of    Other abnormal glucose     Other specified urticaria     Unspecified asthma(493.90)     Unspecified hypertensive heart disease without heart failure     Unspecified vitamin D deficiency     Wears glasses          SURGICAL HISTORY      has a past surgical history that includes Cardiac catheterization; Bunionectomy (Right); tumor excision; Shoulder arthroscopy (Right, 2016); Shoulder arthroscopy (Right, 2016); and Hysterectomy (10/02/2018). CURRENT MEDICATIONS       Previous Medications    CALCIUM CARBONATE-VITAMIN D (CALTRATE 600+D PO)    Take 1 tablet by mouth 2 times daily    MELOXICAM (MOBIC) 7.5 MG TABLET    Take 7.5 mg by mouth daily    TRIAMCINOLONE (KENALOG) 0.025 % CREAM    APPLY TOPICALLY DAILY       ALLERGIES     is allergic to aspirin; norvasc [amlodipine besylate]; penicillins; and vioxx [rofecoxib]. FAMILY HISTORY     She indicated that her mother is . She indicated that her father is . family history includes Cancer in her father and mother. SOCIAL HISTORY      reports that she has never smoked. She has never used smokeless tobacco. She reports that she does not drink alcohol or use drugs. PHYSICAL EXAM     INITIAL VITALS:  height is 5' 2\" (1.575 m) and weight is 195 lb (88.5 kg). Her oral temperature is 97.7 °F (36.5 °C). Her blood pressure is 158/76 (abnormal) and her pulse is 59. Her respiration is 18 and oxygen saturation is 98%. Physical Exam  Vitals signs and nursing note reviewed. Constitutional:       General: She is not in acute distress. HENT:      Head: Normocephalic and atraumatic.    Eyes:      Conjunctiva/sclera: Conjunctivae normal.      Pupils: Pupils are equal, round, and reactive to

## 2020-08-25 NOTE — ED NOTES
Mode of arrival (squad #, walk in, police, etc) : Pt. Walked into ED        Chief complaint(s): Shoulder pain        Arrival Note (brief scenario, treatment PTA, etc).: Pt. States she has shoulder pain that began 7- 10 days ago. Pt. States bending down and lifting things up makes the pain worse. Pt. States the pain awakes her at night. Pt. states she went to to her dr. 7 days ago and the medications she was given have not helped. Pt. States she is still having pain so she come to the ED. Pt. Is alert and oriented resting on cot in room, no signs of distress. C= \"Have you ever felt that you should Cut down on your drinking? \"  No  A= \"Have people Annoyed you by criticizing your drinking? \"  No  G= \"Have you ever felt bad or Guilty about your drinking? \"  No  E= \"Have you ever had a drink as an Eye-opener first thing in the morning to steady your nerves or to help a hangover? \"  No      Deferred []      Reason for deferring: N/A    *If yes to two or more: probable alcohol abuse. Rox Quan RN  08/25/20 [id number]

## 2020-09-10 ENCOUNTER — OFFICE VISIT (OUTPATIENT)
Dept: PODIATRY | Age: 75
End: 2020-09-10
Payer: MEDICARE

## 2020-09-10 VITALS — HEIGHT: 62 IN | WEIGHT: 195 LBS | BODY MASS INDEX: 35.88 KG/M2

## 2020-09-10 PROCEDURE — 99203 OFFICE O/P NEW LOW 30 MIN: CPT | Performed by: PODIATRIST

## 2020-09-10 PROCEDURE — 11720 DEBRIDE NAIL 1-5: CPT | Performed by: PODIATRIST

## 2020-09-10 ASSESSMENT — ENCOUNTER SYMPTOMS
BACK PAIN: 0
DIARRHEA: 0
SHORTNESS OF BREATH: 0
NAUSEA: 0
COLOR CHANGE: 0

## 2020-09-10 NOTE — PROGRESS NOTES
Lisa Dailey is a 70 y.o. female who presents to the office today with chief complaint of pain to the left great toenail. Chief Complaint   Patient presents with    Nail Problem     left hallux   Symptoms began greater than 1 week(s) ago. Patient denies injury to the left foot. Patient states that the nail is painful with pressure. Pain is rated 8 out of 10 at it's worst and is described as intermittent. Treatments prior to today's visit include: None. Allergies   Allergen Reactions    Aspirin     Norvasc [Amlodipine Besylate]     Penicillins     Vioxx [Rofecoxib]        Past Medical History:   Diagnosis Date    Acute gouty arthropathy     Arthritis     Body mass index 36.0-36.9, adult     CAD (coronary artery disease)     Hypertension     hx of    Other abnormal glucose     Other specified urticaria     Unspecified asthma(493.90)     Unspecified hypertensive heart disease without heart failure     Unspecified vitamin D deficiency     Wears glasses        Prior to Admission medications    Medication Sig Start Date End Date Taking? Authorizing Provider   meloxicam (MOBIC) 7.5 MG tablet Take 7.5 mg by mouth daily   Yes Historical Provider, MD   Calcium Carbonate-Vitamin D (CALTRATE 600+D PO) Take 1 tablet by mouth 2 times daily   Yes Historical Provider, MD   tiZANidine (ZANAFLEX) 2 MG tablet Take 1 tablet by mouth every 6 hours as needed (muscle spasm)  Patient not taking: Reported on 9/10/2020 8/25/20 9/24/20  Carol Clark DO   triamcinolone (KENALOG) 0.025 % cream APPLY TOPICALLY DAILY  Patient not taking: Reported on 9/10/2020 4/16/20   Sweetie Ernst MD       Past Surgical History:   Procedure Laterality Date    BUNIONECTOMY Right     CARDIAC CATHETERIZATION      collagen sponge femoral placed and absorbed    HYSTERECTOMY  10/02/2018    SHOULDER ARTHROSCOPY Right 07/14/2016    Acromioplasty, SAD, Mini-open rotator cuff repair.     SHOULDER ARTHROSCOPY Right 07/14/2016    TUMOR EXCISION      fatty tumor left mid back       Family History   Problem Relation Age of Onset    Cancer Mother     Cancer Father        Social History     Tobacco Use    Smoking status: Never Smoker    Smokeless tobacco: Never Used   Substance Use Topics    Alcohol use: No       Review of Systems   Constitutional: Negative for activity change, appetite change, chills, diaphoresis, fatigue and fever. Respiratory: Negative for shortness of breath. Cardiovascular: Negative for leg swelling. Gastrointestinal: Negative for diarrhea and nausea. Endocrine: Negative for cold intolerance, heat intolerance and polyuria. Musculoskeletal: Positive for arthralgias. Negative for back pain, gait problem, joint swelling and myalgias. Skin: Negative for color change, pallor, rash and wound. Allergic/Immunologic: Negative for environmental allergies and food allergies. Neurological: Negative for dizziness, weakness, light-headedness and numbness. Hematological: Does not bruise/bleed easily. Psychiatric/Behavioral: Negative for behavioral problems, confusion and self-injury. The patient is not nervous/anxious. Vitals: There were no vitals filed for this visit. General: AAO x 3 in NAD. Integument: There are no rashes, ulcers, or breaks in the skin noted to the bilateral lower extremities. There is no induration, subcutaneous nodules, or tightening of the skin noted to the bilateral.     Toenails 1-5 of the right foot do present with thickness, discoloration, brittleness, subungual debris. Toenails 1-5 of the left foot do present with thickness, discoloration, brittleness, subungual debris. There is incurvation noted to the lateral border of the left hallux toenail. There is erythema and edema noted to this area of the left hallux. There is no calor, drainage, or malodor noted to the left hallux. There is pain with palpation and debridement of this toenail.     Interdigital maceration absent to web spaces 1-4, Bilateral.     There are no preulcerative lesions noted to the right foot. There are no preulcerative lesions noted to the left foot. The skin to the bilateral feet is not thin and shiny. The skin to the bilateral feet is  warm, supple, and dry. Vascular: DP pulse of the right foot is  palpable. DP pulse of the left foot is  palpable. PT pulse of the right foot is  palpable. PT pulse of the left foot is  palpable. CFT is less than 3 secs to the digits of the right foot. CFT is less than 3 secs to the digits of the left foot. There is edema noted to the left foot. There is hair growth noted to the digits of the bilateral feet. There are no varicosities noted to the right foot/ankle. There are no varicosities noted to the left foot/ankle. Erythema is present to the left feet. Neurological: Reflexes are present to the right plantar foot and to the Achilles tendon. Reflexes are present to the left plantar foot and to the Achilles tendon. Epicritic sensation is  intact to the right foot. Epicritic sensation is  intact to the left foot. Musculoskeletal:  Muscle strength is +5/5 to all four muscle groups of the right lower extremity and +5/5 to all four muscle groups of the left lower extremity. There are no areas of subluxation, dislocation, or laxity noted to either lower extremity. Range of motion to the right ankle is  free of pain or grinding. Range of motion to the left ankle is  free of pain or grinding. Range of motion to the right subtalar joint is  free of pain or grinding. Range of motion to the left subtalar joint is  free of pain or grinding. No abnormalities, asymmetries, or misalignments are seen between the extremities.     Weightbearing evaluation does not reveal rearfoot eversion, medial prominence of the talar head, loss of the medial longitudinal arch height, and too many toes sign bilaterally. The digits of the right foot are not contracted. The digits of the left foot are not contracted. There is no prominence noted to the first metatarsal head without abduction of the hallux of the right foot. There is no prominence noted to the first metatarsal head without abduction of the hallux of the left foot. Shoe examination was performed. Biomechanical Exam: normal bilaterally. Asessment: Patient is a 70 y.o. female with:    Diagnosis Orders   1. OC (onychocryptosis)  CA DEBRIDEMENT OF NAIL(S), 1-5   2. Onychomycosis of toenail  CA DEBRIDEMENT OF NAIL(S), 1-5   3. Pain in toe of left foot  CA DEBRIDEMENT OF NAIL(S), 1-5       Plan:  1. Clinical evaluation of the patient. 2. The left hallux toenail was debrided with nail nippers and a  in a slant back fashion. The left hallux was then dressed with antibiotic ointment and a band aid. Patient to change this dressing daily until the wound heals. I advised the patient to return in 9 weeks for nails, but she declines. 3. Contact office with any questions/problems/concerns. Return if symptoms worsen or fail to improve.    9/10/2020      Ivanna Locke DPM

## 2020-10-21 ENCOUNTER — HOSPITAL ENCOUNTER (OUTPATIENT)
Dept: WOMENS IMAGING | Age: 75
Discharge: HOME | End: 2020-10-23
Payer: MEDICARE

## 2020-10-21 ENCOUNTER — HOSPITAL ENCOUNTER (OUTPATIENT)
Age: 75
Discharge: HOME | End: 2020-10-21
Payer: MEDICARE

## 2020-10-21 LAB
ALBUMIN SERPL-MCNC: 4.2 G/DL (ref 3.5–5.2)
ALBUMIN/GLOBULIN RATIO: ABNORMAL (ref 1–2.5)
ALP BLD-CCNC: 50 U/L (ref 35–104)
ALT SERPL-CCNC: 13 U/L (ref 5–33)
ANION GAP SERPL CALCULATED.3IONS-SCNC: 11 MMOL/L (ref 9–17)
AST SERPL-CCNC: 17 U/L
BILIRUB SERPL-MCNC: 0.32 MG/DL (ref 0.3–1.2)
BUN BLDV-MCNC: 18 MG/DL (ref 8–23)
BUN/CREAT BLD: ABNORMAL (ref 9–20)
CALCIUM SERPL-MCNC: 9.5 MG/DL (ref 8.6–10.4)
CHLORIDE BLD-SCNC: 104 MMOL/L (ref 98–107)
CO2: 24 MMOL/L (ref 20–31)
CREAT SERPL-MCNC: 0.65 MG/DL (ref 0.5–0.9)
GFR AFRICAN AMERICAN: >60 ML/MIN
GFR NON-AFRICAN AMERICAN: >60 ML/MIN
GFR SERPL CREATININE-BSD FRML MDRD: ABNORMAL ML/MIN/{1.73_M2}
GFR SERPL CREATININE-BSD FRML MDRD: ABNORMAL ML/MIN/{1.73_M2}
GLUCOSE BLD-MCNC: 104 MG/DL (ref 70–99)
HCT VFR BLD CALC: 40.9 % (ref 36–46)
HEMOGLOBIN: 13.4 G/DL (ref 12–16)
MCH RBC QN AUTO: 27 PG (ref 26–34)
MCHC RBC AUTO-ENTMCNC: 32.8 G/DL (ref 31–37)
MCV RBC AUTO: 82.4 FL (ref 80–100)
NRBC AUTOMATED: NORMAL PER 100 WBC
PDW BLD-RTO: 14.9 % (ref 11.5–14.9)
PLATELET # BLD: 268 K/UL (ref 150–450)
PMV BLD AUTO: 8.1 FL (ref 6–12)
POTASSIUM SERPL-SCNC: 4.2 MMOL/L (ref 3.7–5.3)
RBC # BLD: 4.96 M/UL (ref 4–5.2)
SODIUM BLD-SCNC: 139 MMOL/L (ref 135–144)
TOTAL PROTEIN: 7.2 G/DL (ref 6.4–8.3)
VITAMIN D 25-HYDROXY: 44.7 NG/ML (ref 30–100)
WBC # BLD: 8.4 K/UL (ref 3.5–11)

## 2020-10-21 PROCEDURE — 36415 COLL VENOUS BLD VENIPUNCTURE: CPT

## 2020-10-21 PROCEDURE — 85027 COMPLETE CBC AUTOMATED: CPT

## 2020-10-21 PROCEDURE — 77063 BREAST TOMOSYNTHESIS BI: CPT

## 2020-10-21 PROCEDURE — 82306 VITAMIN D 25 HYDROXY: CPT

## 2020-10-21 PROCEDURE — 80053 COMPREHEN METABOLIC PANEL: CPT

## 2020-10-26 ENCOUNTER — HOSPITAL ENCOUNTER (OUTPATIENT)
Dept: WOMENS IMAGING | Age: 75
Discharge: HOME | End: 2020-10-28
Payer: MEDICARE

## 2020-10-26 PROCEDURE — 77080 DXA BONE DENSITY AXIAL: CPT

## 2021-02-02 ENCOUNTER — APPOINTMENT (OUTPATIENT)
Dept: GENERAL RADIOLOGY | Age: 76
End: 2021-02-02
Payer: MEDICARE

## 2021-02-02 ENCOUNTER — HOSPITAL ENCOUNTER (EMERGENCY)
Age: 76
Discharge: HOME | End: 2021-02-02
Attending: EMERGENCY MEDICINE
Payer: MEDICARE

## 2021-02-02 ENCOUNTER — APPOINTMENT (OUTPATIENT)
Dept: CT IMAGING | Age: 76
End: 2021-02-02
Payer: MEDICARE

## 2021-02-02 VITALS
SYSTOLIC BLOOD PRESSURE: 109 MMHG | HEIGHT: 59 IN | OXYGEN SATURATION: 93 % | BODY MASS INDEX: 38.1 KG/M2 | RESPIRATION RATE: 20 BRPM | HEART RATE: 72 BPM | TEMPERATURE: 97.9 F | WEIGHT: 189 LBS | DIASTOLIC BLOOD PRESSURE: 74 MMHG

## 2021-02-02 DIAGNOSIS — J40 BRONCHITIS: Primary | ICD-10-CM

## 2021-02-02 LAB
ABSOLUTE EOS #: 0.6 K/UL (ref 0–0.4)
ABSOLUTE IMMATURE GRANULOCYTE: ABNORMAL K/UL (ref 0–0.3)
ABSOLUTE LYMPH #: 3.1 K/UL (ref 1–4.8)
ABSOLUTE MONO #: 0.6 K/UL (ref 0.1–1.3)
ALBUMIN SERPL-MCNC: 3.8 G/DL (ref 3.5–5.2)
ALBUMIN/GLOBULIN RATIO: ABNORMAL (ref 1–2.5)
ALLEN TEST: ABNORMAL
ALP BLD-CCNC: 55 U/L (ref 35–104)
ALT SERPL-CCNC: 21 U/L (ref 5–33)
ANION GAP SERPL CALCULATED.3IONS-SCNC: 11 MMOL/L (ref 9–17)
AST SERPL-CCNC: 22 U/L
BASOPHILS # BLD: 1 % (ref 0–2)
BASOPHILS ABSOLUTE: 0.1 K/UL (ref 0–0.2)
BILIRUB SERPL-MCNC: 0.16 MG/DL (ref 0.3–1.2)
BNP INTERPRETATION: NORMAL
BUN BLDV-MCNC: 20 MG/DL (ref 8–23)
BUN/CREAT BLD: ABNORMAL (ref 9–20)
CALCIUM SERPL-MCNC: 8.9 MG/DL (ref 8.6–10.4)
CARBOXYHEMOGLOBIN: 2.4 % (ref 0–5)
CHLORIDE BLD-SCNC: 103 MMOL/L (ref 98–107)
CO2: 26 MMOL/L (ref 20–31)
CREAT SERPL-MCNC: 0.84 MG/DL (ref 0.5–0.9)
DIFFERENTIAL TYPE: ABNORMAL
DIRECT EXAM: NORMAL
EOSINOPHILS RELATIVE PERCENT: 7 % (ref 0–4)
FIO2: ABNORMAL
GFR AFRICAN AMERICAN: >60 ML/MIN
GFR NON-AFRICAN AMERICAN: >60 ML/MIN
GFR SERPL CREATININE-BSD FRML MDRD: ABNORMAL ML/MIN/{1.73_M2}
GFR SERPL CREATININE-BSD FRML MDRD: ABNORMAL ML/MIN/{1.73_M2}
GLUCOSE BLD-MCNC: 117 MG/DL (ref 70–99)
HCO3 VENOUS: 28.1 MMOL/L (ref 24–30)
HCT VFR BLD CALC: 39.3 % (ref 36–46)
HEMOGLOBIN: 12.9 G/DL (ref 12–16)
IMMATURE GRANULOCYTES: ABNORMAL %
LYMPHOCYTES # BLD: 35 % (ref 24–44)
Lab: NORMAL
MCH RBC QN AUTO: 26.6 PG (ref 26–34)
MCHC RBC AUTO-ENTMCNC: 32.9 G/DL (ref 31–37)
MCV RBC AUTO: 80.9 FL (ref 80–100)
METHEMOGLOBIN: 0.2 % (ref 0–1.9)
MODE: ABNORMAL
MONOCYTES # BLD: 6 % (ref 1–7)
NEGATIVE BASE EXCESS, VEN: ABNORMAL MMOL/L (ref 0–2)
NOTIFICATION TIME: ABNORMAL
NOTIFICATION: ABNORMAL
NRBC AUTOMATED: ABNORMAL PER 100 WBC
O2 DEVICE/FLOW/%: ABNORMAL
O2 SAT, VEN: 93.8 % (ref 60–85)
OXYHEMOGLOBIN: ABNORMAL % (ref 95–98)
PATIENT TEMP: 37
PCO2, VEN, TEMP ADJ: ABNORMAL MMHG (ref 39–55)
PCO2, VEN: 44.9 (ref 39–55)
PDW BLD-RTO: 14.7 % (ref 11.5–14.9)
PEEP/CPAP: ABNORMAL
PH VENOUS: 7.41 (ref 7.32–7.42)
PH, VEN, TEMP ADJ: ABNORMAL (ref 7.32–7.42)
PLATELET # BLD: 238 K/UL (ref 150–450)
PLATELET ESTIMATE: ABNORMAL
PMV BLD AUTO: 8.4 FL (ref 6–12)
PO2, VEN, TEMP ADJ: ABNORMAL MMHG (ref 30–50)
PO2, VEN: 78 (ref 30–50)
POSITIVE BASE EXCESS, VEN: 3.4 MMOL/L (ref 0–2)
POTASSIUM SERPL-SCNC: 4.2 MMOL/L (ref 3.7–5.3)
PRO-BNP: 57 PG/ML
PSV: ABNORMAL
PT. POSITION: ABNORMAL
RBC # BLD: 4.86 M/UL (ref 4–5.2)
RBC # BLD: ABNORMAL 10*6/UL
RESPIRATORY RATE: ABNORMAL
SAMPLE SITE: ABNORMAL
SARS-COV-2, RAPID: NOT DETECTED
SARS-COV-2: NORMAL
SARS-COV-2: NORMAL
SEG NEUTROPHILS: 51 % (ref 36–66)
SEGMENTED NEUTROPHILS ABSOLUTE COUNT: 4.6 K/UL (ref 1.3–9.1)
SET RATE: ABNORMAL
SODIUM BLD-SCNC: 140 MMOL/L (ref 135–144)
SOURCE: NORMAL
SPECIMEN DESCRIPTION: NORMAL
TEXT FOR RESPIRATORY: ABNORMAL
TOTAL HB: ABNORMAL G/DL (ref 12–16)
TOTAL PROTEIN: 6.6 G/DL (ref 6.4–8.3)
TOTAL RATE: ABNORMAL
TROPONIN INTERP: NORMAL
TROPONIN T: NORMAL NG/ML
TROPONIN, HIGH SENSITIVITY: 10 NG/L (ref 0–14)
VT: ABNORMAL
WBC # BLD: 9 K/UL (ref 3.5–11)
WBC # BLD: ABNORMAL 10*3/UL

## 2021-02-02 PROCEDURE — 36415 COLL VENOUS BLD VENIPUNCTURE: CPT

## 2021-02-02 PROCEDURE — 6360000004 HC RX CONTRAST MEDICATION: Performed by: EMERGENCY MEDICINE

## 2021-02-02 PROCEDURE — U0002 COVID-19 LAB TEST NON-CDC: HCPCS

## 2021-02-02 PROCEDURE — 93971 EXTREMITY STUDY: CPT

## 2021-02-02 PROCEDURE — 80053 COMPREHEN METABOLIC PANEL: CPT

## 2021-02-02 PROCEDURE — 93005 ELECTROCARDIOGRAM TRACING: CPT | Performed by: EMERGENCY MEDICINE

## 2021-02-02 PROCEDURE — 83880 ASSAY OF NATRIURETIC PEPTIDE: CPT

## 2021-02-02 PROCEDURE — 99284 EMERGENCY DEPT VISIT MOD MDM: CPT

## 2021-02-02 PROCEDURE — 85025 COMPLETE CBC W/AUTO DIFF WBC: CPT

## 2021-02-02 PROCEDURE — 2580000003 HC RX 258: Performed by: EMERGENCY MEDICINE

## 2021-02-02 PROCEDURE — 84484 ASSAY OF TROPONIN QUANT: CPT

## 2021-02-02 PROCEDURE — 87804 INFLUENZA ASSAY W/OPTIC: CPT

## 2021-02-02 PROCEDURE — 82805 BLOOD GASES W/O2 SATURATION: CPT

## 2021-02-02 PROCEDURE — 71260 CT THORAX DX C+: CPT

## 2021-02-02 PROCEDURE — 82800 BLOOD PH: CPT

## 2021-02-02 PROCEDURE — 71045 X-RAY EXAM CHEST 1 VIEW: CPT

## 2021-02-02 RX ORDER — 0.9 % SODIUM CHLORIDE 0.9 %
80 INTRAVENOUS SOLUTION INTRAVENOUS ONCE
Status: COMPLETED | OUTPATIENT
Start: 2021-02-02 | End: 2021-02-02

## 2021-02-02 RX ORDER — SODIUM CHLORIDE 0.9 % (FLUSH) 0.9 %
10 SYRINGE (ML) INJECTION PRN
Status: DISCONTINUED | OUTPATIENT
Start: 2021-02-02 | End: 2021-02-02 | Stop reason: HOSPADM

## 2021-02-02 RX ORDER — ALBUTEROL SULFATE 90 UG/1
1-2 AEROSOL, METERED RESPIRATORY (INHALATION) EVERY 4 HOURS PRN
Qty: 1 INHALER | Refills: 0 | Status: SHIPPED | OUTPATIENT
Start: 2021-02-02 | End: 2022-08-09

## 2021-02-02 RX ORDER — GUAIFENESIN/DEXTROMETHORPHAN 100-10MG/5
5 SYRUP ORAL 3 TIMES DAILY PRN
Qty: 120 ML | Refills: 0 | Status: SHIPPED | OUTPATIENT
Start: 2021-02-02 | End: 2021-02-12

## 2021-02-02 RX ORDER — PREDNISONE 10 MG/1
TABLET ORAL
Qty: 20 TABLET | Refills: 0 | Status: SHIPPED | OUTPATIENT
Start: 2021-02-02 | End: 2021-02-12

## 2021-02-02 RX ADMIN — Medication 10 ML: at 14:27

## 2021-02-02 RX ADMIN — IOPAMIDOL 75 ML: 755 INJECTION, SOLUTION INTRAVENOUS at 14:27

## 2021-02-02 RX ADMIN — SODIUM CHLORIDE 80 ML: 9 INJECTION, SOLUTION INTRAVENOUS at 14:27

## 2021-02-02 ASSESSMENT — ENCOUNTER SYMPTOMS
COUGH: 1
NAUSEA: 0
VOMITING: 0
ABDOMINAL PAIN: 0
DIARRHEA: 0
BACK PAIN: 0
SHORTNESS OF BREATH: 1
WHEEZING: 1

## 2021-02-02 ASSESSMENT — PAIN DESCRIPTION - PAIN TYPE: TYPE: ACUTE PAIN

## 2021-02-02 ASSESSMENT — PAIN DESCRIPTION - DESCRIPTORS: DESCRIPTORS: DISCOMFORT

## 2021-02-02 ASSESSMENT — PAIN DESCRIPTION - ORIENTATION: ORIENTATION: MID

## 2021-02-02 ASSESSMENT — PAIN DESCRIPTION - LOCATION: LOCATION: CHEST

## 2021-02-02 NOTE — ED PROVIDER NOTES
[address]  EMERGENCY DEPARTMENT ENCOUNTER      Pt Name: Aydin Weston  MRN: [id number]  Annettagfijeoma 1945  Date of evaluation: 2/2/21      CHIEF COMPLAINT       Chief Complaint   Patient presents with    Cough    Wheezing    Shortness of Breath    Chest Pain     HISTORY OF PRESENT ILLNESS   HPI 70 y.o. female presents with c/o cough, sob, wheezing, and cp. Pt reports symptoms have been present for about 1 week. No history of lung disease. Symptoms assoicated with a retrosternal cp while coughing. Symptoms rated as moderate to severe in severity, constant in course, progressively worsening. No known covid exposure. Denies fevers. Also reports leg swelling, R>L    REVIEW OF SYSTEMS       Review of Systems   Constitutional: Negative for fatigue and fever. HENT: Negative for congestion. Eyes: Negative for visual disturbance. Respiratory: Positive for cough, shortness of breath and wheezing. Cardiovascular: Positive for chest pain and leg swelling. Gastrointestinal: Negative for abdominal pain, diarrhea, nausea and vomiting. Genitourinary: Negative for dysuria. Musculoskeletal: Negative for back pain. Skin: Negative for rash. Neurological: Negative for headaches. Hematological: Negative for adenopathy.        PAST MEDICAL HISTORY     Past Medical History:   Diagnosis Date    Acute gouty arthropathy     Arthritis     Body mass index 36.0-36.9, adult     CAD (coronary artery disease)     Hypertension     hx of    Other abnormal glucose     Other specified urticaria     Unspecified asthma(493.90)     Unspecified hypertensive heart disease without heart failure     Unspecified vitamin D deficiency     Wears glasses        SURGICAL HISTORY       Past Surgical History:   Procedure Laterality Date    BUNIONECTOMY Right     CARDIAC CATHETERIZATION      collagen sponge femoral placed and absorbed    HYSTERECTOMY  10/02/2018    SHOULDER ARTHROSCOPY Right 07/14/2016 Acromioplasty, SAD, Mini-open rotator cuff repair.  SHOULDER ARTHROSCOPY Right 2016    TUMOR EXCISION      fatty tumor left mid back       CURRENT MEDICATIONS       Discharge Medication List as of 2021  4:01 PM      CONTINUE these medications which have NOT CHANGED    Details   benzonatate (TESSALON) 100 MG capsule Take 1 capsule by mouth 3 times daily as needed for Cough, Disp-30 capsule, R-0Normal      celecoxib (CELEBREX) 200 MG capsule Take 1 capsule by mouth daily, Disp-90 capsule,R-3Normal      triamcinolone (KENALOG) 0.025 % cream APPLY TOPICALLY DAILY, Disp-80 g,R-10, Normal      Calcium Carbonate-Vitamin D (CALTRATE 600+D PO) Take 1 tablet by mouth 2 times dailyHistorical Med             ALLERGIES     is allergic to aspirin; norvasc [amlodipine besylate]; penicillins; and vioxx [rofecoxib]. FAMILY HISTORY     She indicated that her mother is . She indicated that her father is . SOCIAL HISTORY      reports that she has never smoked. She has never used smokeless tobacco. She reports that she does not drink alcohol or use drugs. PHYSICAL EXAM     INITIAL VITALS: /74   Pulse 72   Temp 97.9 °F (36.6 °C)   Resp 20   Ht 4' 11\" (1.499 m)   Wt 189 lb (85.7 kg)   SpO2 93%   BMI 38.17 kg/m²   Gen: nad  Head: Normocephalic, atraumatic  Eye: Pupils equal round reactive to light, no conjunctivitis  ENT: MMM  Neck: no JVD  Heart: Regular rate and rhythm no murmurs  Lungs: wheezing bilaterally, no respiratory distress  Chest wall: No crepitus, no tenderness palpation  Abdomen: Soft, nontender, nondistended, with no peritoneal signs  Neurologic: Patient is alert and oriented x3, motor and sensation is intact in all 4 extremities, fluent speech  Extremities: bilateral edema, r calf tenderness to palpation, no unequal edema    MEDICAL DECISION MAKING:     MDM  70 y.o. female sob, cough wheezing. Leg edema and calf pain. Will r/o dvt. Will r/o pe.   Will evaluate for pna and covid. R/o atypical acs / chf.       Emergency Department course:    Blood gas shows no hypercapnea. COVID negative. Doppler shows no dvt. No troponin elevation. No BNP elevation, doubt CHF. No pulmonary embolism identified on CT scan. Suspect a bronchitis. D/w pt the results, treatment plan, warning precautions for prompt ED return and importance of close OP FU, she verbalizes understanding and agrees with the treatment plan. DIAGNOSTIC RESULTS     EKG: All EKG's are interpreted by the Emergency Department Physician who either signs or Co-signs this chart in the absence of a cardiologist.    EKG shows a sinus rhythm. HR is 73, , QRS 90, , no LARISA, No STD, No TWI, the axis is normal.        RADIOLOGY:All plain film, CT, MRI, and formal ultrasound images (except ED bedside ultrasound) are read by the radiologist and the images and interpretations are directly viewed by the emergency physician. VASCULAR REPORT   Final Result      CT CHEST PULMONARY EMBOLISM W CONTRAST   Final Result   Motion compromised examination, no pulmonary embolism demonstrated as   discussed. Otherwise negative examination. No evidence of acute pulmonary   process. XR CHEST PORTABLE   Final Result   No radiographic evidence of acute cardiopulmonary disease. VL Lower Extremity Venous Right   Final Result          LABS: All lab results were reviewed by myself, and all abnormals are listed below.   Labs Reviewed   CBC WITH AUTO DIFFERENTIAL - Abnormal; Notable for the following components:       Result Value    Eosinophils % 7 (*)     Absolute Eos # 0.60 (*)     All other components within normal limits   COMPREHENSIVE METABOLIC PANEL - Abnormal; Notable for the following components:    Glucose 117 (*)     Total Bilirubin 0.16 (*)     All other components within normal limits   BLOOD GAS, VENOUS - Abnormal; Notable for the following components:    pO2, Paul 78.0 (*)     Positive Base Excess, Paul 3.4 (*)     O2 Sat, Paul 93.8 (*)     All other components within normal limits   RAPID INFLUENZA A/B ANTIGENS   BRAIN NATRIURETIC PEPTIDE   COVID-19   TROPONIN       EMERGENCY DEPARTMENT COURSE:   Vitals:    Vitals:    02/02/21 1050 02/02/21 1515   BP: (!) 150/64 109/74   Pulse: 78 72   Resp: 20 20   Temp: 97.9 °F (36.6 °C)    SpO2: 95% 93%   Weight: 189 lb (85.7 kg)    Height: 4' 11\" (1.499 m)        The patient was given the following medications while in the emergency department:  Orders Placed This Encounter   Medications    DISCONTD: sodium chloride flush 0.9 % injection 10 mL    0.9 % sodium chloride bolus    iopamidol (ISOVUE-370) 76 % injection 75 mL    guaiFENesin-dextromethorphan (ROBITUSSIN DM) 100-10 MG/5ML syrup     Sig: Take 5 mLs by mouth 3 times daily as needed for Cough     Dispense:  120 mL     Refill:  0    predniSONE (DELTASONE) 10 MG tablet     Sig: Take 4 tablets by mouth once daily for 5 days     Dispense:  20 tablet     Refill:  0    albuterol sulfate HFA (PROVENTIL HFA) 108 (90 Base) MCG/ACT inhaler     Sig: Inhale 1-2 puffs into the lungs every 4 hours as needed for Wheezing     Dispense:  1 Inhaler     Refill:  0     -------------------------  CRITICAL CARE:   CONSULTS: None  PROCEDURES: Procedures     FINAL IMPRESSION      1.  Bronchitis          DISPOSITION/PLAN   DISPOSITION Decision To Discharge 02/02/2021 03:51:02 PM      PATIENT REFERRED TO:  Giselle Pham, [address] [facility] [address] [facility] [phone]    In 2 days      [facility] ED  [facility] [address] [place] [address] [address] [address]  [phone]    If symptoms worsen      DISCHARGE MEDICATIONS:  Discharge Medication List as of 2/2/2021  4:01 PM      START taking these medications    Details   guaiFENesin-dextromethorphan (ROBITUSSIN DM) 100-10 MG/5ML syrup Take 5 mLs by mouth 3 times daily as needed for Cough, Disp-120 mL, R-0Print      predniSONE (DELTASONE) 10 MG tablet Take 4 tablets by mouth once daily for 5 days, Disp-20 tablet, R-0Print      albuterol sulfate HFA (PROVENTIL HFA) 108 (90 Base) MCG/ACT inhaler Inhale 1-2 puffs into the lungs every 4 hours as needed for Wheezing, Disp-1 Inhaler, R-0Print               Deonna Wilson MD  Attending Emergency Physician                      Deonna Wilson MD  02/03/21 1038

## 2021-02-02 NOTE — ED TRIAGE NOTES
Mode of arrival (squad #, walk in, police, etc) : Walk In        Chief complaint(s): Cough, wheezing, chest pain, shortness of breath        Arrival Note (brief scenario, treatment PTA, etc). : Pt arrives to ED c/o cough and wheezing that has been ongoing for the last week. Patient states that her cough had been dry. Patient states that it gets worse at night when she lays down. Patient states she has been having some discomfort in her chest that she thinks is from the coughing. Patient denies history of asthma or COPD. Patient is placed on cardiac monitor and continuous pulse oximetry. Patient is resting on stretcher with no s/s of distress. C= \"Have you ever felt that you should Cut down on your drinking? \"  No  A= \"Have people Annoyed you by criticizing your drinking? \"  No  G= \"Have you ever felt bad or Guilty about your drinking? \"  No  E= \"Have you ever had a drink as an Eye-opener first thing in the morning to steady your nerves or to help a hangover? \"  No      Deferred []      Reason for deferring: N/A    *If yes to two or more: probable alcohol abuse. *

## 2021-02-03 LAB
EKG ATRIAL RATE: 73 BPM
EKG P AXIS: 54 DEGREES
EKG P-R INTERVAL: 164 MS
EKG Q-T INTERVAL: 390 MS
EKG QRS DURATION: 90 MS
EKG QTC CALCULATION (BAZETT): 429 MS
EKG R AXIS: -29 DEGREES
EKG T AXIS: 20 DEGREES
EKG VENTRICULAR RATE: 73 BPM

## 2021-02-03 PROCEDURE — 93010 ELECTROCARDIOGRAM REPORT: CPT | Performed by: INTERNAL MEDICINE

## 2021-05-12 PROBLEM — E66.01 MORBIDLY OBESE (HCC): Status: ACTIVE | Noted: 2021-05-12

## 2021-11-15 ENCOUNTER — HOSPITAL ENCOUNTER (OUTPATIENT)
Dept: WOMENS IMAGING | Age: 76
Discharge: HOME | End: 2021-11-17
Payer: MEDICARE

## 2021-11-15 DIAGNOSIS — Z12.31 ENCOUNTER FOR SCREENING MAMMOGRAM FOR MALIGNANT NEOPLASM OF BREAST: ICD-10-CM

## 2021-11-15 PROCEDURE — 77063 BREAST TOMOSYNTHESIS BI: CPT

## 2023-02-06 ENCOUNTER — HOSPITAL ENCOUNTER (OUTPATIENT)
Dept: WOMENS IMAGING | Age: 78
Discharge: HOME | End: 2023-02-08
Payer: MEDICARE

## 2023-02-06 DIAGNOSIS — Z12.31 ENCOUNTER FOR SCREENING MAMMOGRAM FOR MALIGNANT NEOPLASM OF BREAST: ICD-10-CM

## 2023-02-06 PROCEDURE — 77067 SCR MAMMO BI INCL CAD: CPT

## 2023-05-19 PROBLEM — N81.10 FEMALE CYSTOCELE: Status: ACTIVE | Noted: 2018-08-08

## 2023-05-19 PROBLEM — H52.4 HYPEROPIA OF BOTH EYES WITH ASTIGMATISM AND PRESBYOPIA: Status: ACTIVE | Noted: 2021-05-06

## 2023-05-19 PROBLEM — H52.03 HYPEROPIA OF BOTH EYES WITH ASTIGMATISM AND PRESBYOPIA: Status: ACTIVE | Noted: 2021-05-06

## 2023-05-19 PROBLEM — H52.203 HYPEROPIA OF BOTH EYES WITH ASTIGMATISM AND PRESBYOPIA: Status: ACTIVE | Noted: 2021-05-06

## 2023-05-19 PROBLEM — H25.13 NUCLEAR SCLEROTIC CATARACT OF BOTH EYES: Status: ACTIVE | Noted: 2021-05-06

## 2023-05-23 ENCOUNTER — APPOINTMENT (OUTPATIENT)
Dept: GENERAL RADIOLOGY | Age: 78
End: 2023-05-23
Payer: MEDICARE

## 2023-05-23 ENCOUNTER — HOSPITAL ENCOUNTER (EMERGENCY)
Age: 78
Discharge: HOME | End: 2023-05-23
Attending: EMERGENCY MEDICINE
Payer: MEDICARE

## 2023-05-23 VITALS
WEIGHT: 180 LBS | DIASTOLIC BLOOD PRESSURE: 70 MMHG | TEMPERATURE: 97.5 F | HEIGHT: 64 IN | RESPIRATION RATE: 15 BRPM | SYSTOLIC BLOOD PRESSURE: 146 MMHG | BODY MASS INDEX: 30.73 KG/M2 | OXYGEN SATURATION: 100 % | HEART RATE: 68 BPM

## 2023-05-23 DIAGNOSIS — M25.562 LEFT KNEE PAIN, UNSPECIFIED CHRONICITY: Primary | ICD-10-CM

## 2023-05-23 PROCEDURE — 6360000002 HC RX W HCPCS: Performed by: EMERGENCY MEDICINE

## 2023-05-23 PROCEDURE — 73562 X-RAY EXAM OF KNEE 3: CPT

## 2023-05-23 PROCEDURE — 96372 THER/PROPH/DIAG INJ SC/IM: CPT

## 2023-05-23 PROCEDURE — 99284 EMERGENCY DEPT VISIT MOD MDM: CPT

## 2023-05-23 RX ORDER — KETOROLAC TROMETHAMINE 30 MG/ML
15 INJECTION, SOLUTION INTRAMUSCULAR; INTRAVENOUS ONCE
Status: DISCONTINUED | OUTPATIENT
Start: 2023-05-23 | End: 2023-05-23

## 2023-05-23 RX ORDER — KETOROLAC TROMETHAMINE 30 MG/ML
15 INJECTION, SOLUTION INTRAMUSCULAR; INTRAVENOUS ONCE
Status: COMPLETED | OUTPATIENT
Start: 2023-05-23 | End: 2023-05-23

## 2023-05-23 RX ADMIN — KETOROLAC TROMETHAMINE 15 MG: 30 INJECTION, SOLUTION INTRAMUSCULAR; INTRAVENOUS at 09:23

## 2023-05-23 ASSESSMENT — PAIN SCALES - GENERAL
PAINLEVEL_OUTOF10: 9
PAINLEVEL_OUTOF10: 5
PAINLEVEL_OUTOF10: 10

## 2023-05-23 ASSESSMENT — ENCOUNTER SYMPTOMS
SHORTNESS OF BREATH: 0
BACK PAIN: 0
ABDOMINAL PAIN: 0
COLOR CHANGE: 0
EYE PAIN: 0

## 2023-05-23 ASSESSMENT — LIFESTYLE VARIABLES
HOW OFTEN DO YOU HAVE A DRINK CONTAINING ALCOHOL: NEVER
HOW MANY STANDARD DRINKS CONTAINING ALCOHOL DO YOU HAVE ON A TYPICAL DAY: PATIENT DOES NOT DRINK

## 2023-05-23 ASSESSMENT — PAIN - FUNCTIONAL ASSESSMENT: PAIN_FUNCTIONAL_ASSESSMENT: 0-10

## 2023-05-23 NOTE — ED PROVIDER NOTES
Complexity of Problems Addressed at this Encounter  Problem List This Visit: Knee pain    Differential Diagnosis: Arthritis, strain, sprain    Diagnoses Considered but Do Not Suspect: Septic arthritis    Pertinent Comorbid Conditions: History of knee arthritis    3)  Treatment and Disposition         We will check x-ray and provide symptomatic treatment    X-ray reviewed showing degenerative changes no acute abnormalities    Suspect that the pain is related to patient's degenerative changes suspect exacerbation given her recent reported history of increasing activity    Patient was reevaluated reports that her pain has improved    Results were discussed with patient discussed pain likely related to arthritis discussed supportive care need for follow-up with PCP orthopedic surgery and return precautions, patient voiced understanding comfortable with plan and discharge    Patient/Guardian was informed of their diagnosis and told to follow up with PCP & orthopedic surgery in 1-3 days. Patient demonstrates understanding and agreement with the plan. They were given the opportunity to ask questions and those questions were answered to the best of our ability with the available information. Patient/Guardian told to return to the ED for any new, worsening, changing or persistent symptoms. This dictation was prepared using [employer] voice recognition software. CRITICAL CARE:       PROCEDURES:    Procedures      DATA FOR LAB AND RADIOLOGY TESTS ORDERED BELOW ARE REVIEWED BY THE ED CLINICIAN:    RADIOLOGY: All x-rays, CT, MRI, and formal ultrasound images (except ED bedside ultrasound) are read by the radiologist, see reports below, unless otherwise noted in MDM or here. Reports below are reviewed by myself. XR KNEE LEFT (3 VIEWS)   Final Result   Degenerative changes manifested by periarticular osteophytes and   chondrocalcinosis of the menisci. No acute osseous abnormality.              LABS: Lab orders

## 2023-05-23 NOTE — ED NOTES
Pt comes to this ER with c/o diffuse lt knee pain x 10 days. Pt states she was previously dx with arthritis, and she has been walking on her knee more to visit her  who is in the hospital.  Pt states the pain has increased in severity to where it is difficult to bear weight. Pt arrives A+O x 4, GCS = 15, PMS x 4 intact, eupneic, and PWD.        Kev Ramos RN  05/23/23 1282

## 2023-05-24 ENCOUNTER — HOSPITAL ENCOUNTER (OUTPATIENT)
Age: 78
Discharge: HOME | End: 2023-05-24
Payer: MEDICARE

## 2023-05-24 DIAGNOSIS — R53.83 OTHER FATIGUE: ICD-10-CM

## 2023-05-24 LAB
ANION GAP SERPL CALCULATED.3IONS-SCNC: 9 MMOL/L (ref 9–17)
BASOPHILS # BLD: 0.07 K/UL (ref 0–0.2)
BASOPHILS NFR BLD: 1 % (ref 0–2)
BUN SERPL-MCNC: 19 MG/DL (ref 8–23)
CALCIUM SERPL-MCNC: 9.3 MG/DL (ref 8.6–10.4)
CHLORIDE SERPL-SCNC: 103 MMOL/L (ref 98–107)
CO2 SERPL-SCNC: 27 MMOL/L (ref 20–31)
CREAT SERPL-MCNC: 0.73 MG/DL (ref 0.5–0.9)
EOSINOPHIL # BLD: 0.21 K/UL (ref 0–0.4)
EOSINOPHILS RELATIVE PERCENT: 3 % (ref 0–4)
ERYTHROCYTE [DISTWIDTH] IN BLOOD BY AUTOMATED COUNT: 14.3 % (ref 11.5–14.9)
GFR SERPL CREATININE-BSD FRML MDRD: >60 ML/MIN/1.73M2
GLUCOSE SERPL-MCNC: 106 MG/DL (ref 70–99)
HCT VFR BLD AUTO: 38.1 % (ref 36–46)
HGB BLD-MCNC: 12.6 G/DL (ref 12–16)
LYMPHOCYTES # BLD: 49 % (ref 24–44)
LYMPHOCYTES NFR BLD: 3.37 K/UL (ref 1–4.8)
MCH RBC QN AUTO: 26.9 PG (ref 26–34)
MCHC RBC AUTO-ENTMCNC: 33.2 G/DL (ref 31–37)
MCV RBC AUTO: 81.2 FL (ref 80–100)
MONOCYTES NFR BLD: 0.35 K/UL (ref 0.1–1.3)
MONOCYTES NFR BLD: 5 % (ref 1–7)
MORPHOLOGY: ABNORMAL
MORPHOLOGY: ABNORMAL
NEUTROPHILS NFR BLD: 42 % (ref 36–66)
NEUTS SEG NFR BLD: 2.9 K/UL (ref 1.3–9.1)
PLATELET # BLD AUTO: 243 K/UL (ref 150–450)
PMV BLD AUTO: 8.1 FL (ref 6–12)
POTASSIUM SERPL-SCNC: 4.8 MMOL/L (ref 3.7–5.3)
RBC # BLD AUTO: 4.69 M/UL (ref 4–5.2)
SODIUM SERPL-SCNC: 139 MMOL/L (ref 135–144)
T4 FREE SERPL-MCNC: 1.2 NG/DL (ref 0.9–1.7)
TSH SERPL-MCNC: 2.64 UIU/ML (ref 0.3–5)
WBC OTHER # BLD: 6.9 K/UL (ref 3.5–11)

## 2023-05-24 PROCEDURE — 84443 ASSAY THYROID STIM HORMONE: CPT

## 2023-05-24 PROCEDURE — 80048 BASIC METABOLIC PNL TOTAL CA: CPT

## 2023-05-24 PROCEDURE — 85025 COMPLETE CBC W/AUTO DIFF WBC: CPT

## 2023-05-24 PROCEDURE — 36415 COLL VENOUS BLD VENIPUNCTURE: CPT

## 2023-05-24 PROCEDURE — 84439 ASSAY OF FREE THYROXINE: CPT

## 2023-05-25 ENCOUNTER — HOSPITAL ENCOUNTER (OUTPATIENT)
Age: 78
Discharge: HOME | End: 2023-05-25
Payer: MEDICARE

## 2023-05-25 DIAGNOSIS — E55.9 VITAMIN D DEFICIENCY: ICD-10-CM

## 2023-05-25 LAB — 25(OH)D3 SERPL-MCNC: 53.4 NG/ML

## 2023-05-25 PROCEDURE — 82306 VITAMIN D 25 HYDROXY: CPT

## 2023-05-31 ENCOUNTER — OFFICE VISIT (OUTPATIENT)
Dept: ORTHOPEDIC SURGERY | Age: 78
End: 2023-05-31
Payer: MEDICARE

## 2023-05-31 VITALS — WEIGHT: 180 LBS | HEIGHT: 64 IN | RESPIRATION RATE: 14 BRPM | BODY MASS INDEX: 30.73 KG/M2

## 2023-05-31 DIAGNOSIS — M17.12 PRIMARY OSTEOARTHRITIS OF LEFT KNEE: Primary | ICD-10-CM

## 2023-05-31 PROCEDURE — G8417 CALC BMI ABV UP PARAM F/U: HCPCS | Performed by: PHYSICIAN ASSISTANT

## 2023-05-31 PROCEDURE — 99204 OFFICE O/P NEW MOD 45 MIN: CPT | Performed by: PHYSICIAN ASSISTANT

## 2023-05-31 PROCEDURE — 1123F ACP DISCUSS/DSCN MKR DOCD: CPT | Performed by: PHYSICIAN ASSISTANT

## 2023-05-31 PROCEDURE — 1090F PRES/ABSN URINE INCON ASSESS: CPT | Performed by: PHYSICIAN ASSISTANT

## 2023-05-31 PROCEDURE — G8427 DOCREV CUR MEDS BY ELIG CLIN: HCPCS | Performed by: PHYSICIAN ASSISTANT

## 2023-05-31 PROCEDURE — G8399 PT W/DXA RESULTS DOCUMENT: HCPCS | Performed by: PHYSICIAN ASSISTANT

## 2023-05-31 PROCEDURE — 20610 DRAIN/INJ JOINT/BURSA W/O US: CPT | Performed by: PHYSICIAN ASSISTANT

## 2023-05-31 PROCEDURE — 1036F TOBACCO NON-USER: CPT | Performed by: PHYSICIAN ASSISTANT

## 2023-05-31 RX ORDER — LIDOCAINE HYDROCHLORIDE 10 MG/ML
2 INJECTION, SOLUTION INFILTRATION; PERINEURAL ONCE
Status: COMPLETED | OUTPATIENT
Start: 2023-05-31 | End: 2023-05-31

## 2023-05-31 RX ORDER — BETAMETHASONE SODIUM PHOSPHATE AND BETAMETHASONE ACETATE 3; 3 MG/ML; MG/ML
12 INJECTION, SUSPENSION INTRA-ARTICULAR; INTRALESIONAL; INTRAMUSCULAR; SOFT TISSUE ONCE
Status: COMPLETED | OUTPATIENT
Start: 2023-05-31 | End: 2023-05-31

## 2023-05-31 RX ORDER — BUPIVACAINE HYDROCHLORIDE 2.5 MG/ML
2 INJECTION, SOLUTION INFILTRATION; PERINEURAL ONCE
Status: COMPLETED | OUTPATIENT
Start: 2023-05-31 | End: 2023-05-31

## 2023-05-31 RX ADMIN — LIDOCAINE HYDROCHLORIDE 2 ML: 10 INJECTION, SOLUTION INFILTRATION; PERINEURAL at 09:29

## 2023-05-31 RX ADMIN — BETAMETHASONE SODIUM PHOSPHATE AND BETAMETHASONE ACETATE 12 MG: 3; 3 INJECTION, SUSPENSION INTRA-ARTICULAR; INTRALESIONAL; INTRAMUSCULAR; SOFT TISSUE at 09:29

## 2023-05-31 RX ADMIN — BUPIVACAINE HYDROCHLORIDE 5 MG: 2.5 INJECTION, SOLUTION INFILTRATION; PERINEURAL at 09:29

## 2023-12-13 ENCOUNTER — OFFICE VISIT (OUTPATIENT)
Dept: ORTHOPEDIC SURGERY | Age: 78
End: 2023-12-13
Payer: MEDICARE

## 2023-12-13 VITALS — HEIGHT: 64 IN | WEIGHT: 180 LBS | RESPIRATION RATE: 14 BRPM | BODY MASS INDEX: 30.73 KG/M2

## 2023-12-13 DIAGNOSIS — M25.50 POLYARTHRALGIA: ICD-10-CM

## 2023-12-13 DIAGNOSIS — M17.0 PRIMARY LOCALIZED OSTEOARTHRITIS OF KNEES, BILATERAL: Primary | ICD-10-CM

## 2023-12-13 PROCEDURE — 99213 OFFICE O/P EST LOW 20 MIN: CPT | Performed by: PHYSICIAN ASSISTANT

## 2023-12-13 PROCEDURE — G8417 CALC BMI ABV UP PARAM F/U: HCPCS | Performed by: PHYSICIAN ASSISTANT

## 2023-12-13 PROCEDURE — 1090F PRES/ABSN URINE INCON ASSESS: CPT | Performed by: PHYSICIAN ASSISTANT

## 2023-12-13 PROCEDURE — 1036F TOBACCO NON-USER: CPT | Performed by: PHYSICIAN ASSISTANT

## 2023-12-13 PROCEDURE — G8399 PT W/DXA RESULTS DOCUMENT: HCPCS | Performed by: PHYSICIAN ASSISTANT

## 2023-12-13 PROCEDURE — G8427 DOCREV CUR MEDS BY ELIG CLIN: HCPCS | Performed by: PHYSICIAN ASSISTANT

## 2023-12-13 PROCEDURE — 1123F ACP DISCUSS/DSCN MKR DOCD: CPT | Performed by: PHYSICIAN ASSISTANT

## 2023-12-13 PROCEDURE — G8484 FLU IMMUNIZE NO ADMIN: HCPCS | Performed by: PHYSICIAN ASSISTANT

## 2023-12-13 RX ORDER — TIZANIDINE 4 MG/1
4 TABLET ORAL 2 TIMES DAILY PRN
Qty: 60 TABLET | Refills: 0 | Status: SHIPPED | OUTPATIENT
Start: 2023-12-13

## 2023-12-13 NOTE — PROGRESS NOTES
[address] [facility] [address] [facility], [address] [place] [address], [address] [place] [address] [address]           Dept Phone: [phone]           Dept Fax:  [phone] [address]          Dept Phone: [phone]           Dept Fax:  [phone]      Chief Compliant:  Chief Complaint   Patient presents with    Knee Pain     Matti knee         History of Present Illness: This is a 70 y.o. female who presents to the clinic today for evaluation of had concerns including Knee Pain (Matti knee ). Ms. Cindie Kocher is a pleasant 61-year-old female returns today for reevaluation of chronic bilateral knee pain. Patient reporting pain for over a year that has progressively worsened over the last several months. Patient was actually seen for left knee pain on 5/31/2023 and  underwent corticosteroid injection. She is also started meloxicam at that time. Patient states the injection did seem to help significant with her left knee pain but has gradually worn off over the last several weeks. Unfortunately despite meloxicam she continues to be painful not only in bilateral knees but also notes pain in bilateral ankles, bilateral feet and bilateral shoulders.   She denies any new injury or trauma       Past History:    Current Outpatient Medications:     tiZANidine (ZANAFLEX) 4 MG tablet, Take 1 tablet by mouth 2 times daily as needed (for pain), Disp: 60 tablet, Rfl: 0    meloxicam (MOBIC) 15 MG tablet, Take 1 tablet by mouth daily, Disp: 90 tablet, Rfl: 3    celecoxib (CELEBREX) 200 MG capsule, TAKE 1 CAPSULE DAILY, Disp: 90 capsule, Rfl: 3    Calcium Carbonate-Vitamin D (CALTRATE 600+D PO), Take 1 tablet by mouth 2 times daily, Disp: , Rfl:   Allergies   Allergen Reactions    Aspirin     Norvasc [Amlodipine Besylate]     Penicillins     Vioxx [Rofecoxib]      Social History

## 2024-02-05 ENCOUNTER — HOSPITAL ENCOUNTER (OUTPATIENT)
Dept: PHYSICAL THERAPY | Age: 79
Setting detail: THERAPIES SERIES
Discharge: HOME | End: 2024-02-05
Payer: MEDICARE

## 2024-02-05 PROCEDURE — 97162 PT EVAL MOD COMPLEX 30 MIN: CPT

## 2024-02-05 NOTE — THERAPY EVALUATION
TINY + []         - []           NT []  + []         - []           NT []    FADIR + []         - [x]           NT []  + []         - [x]           NT []    Scour + []         - [x]           NT []  + []         - [x]           NT []    Trendelenburg Sign + []         - []           NT []  + []         - []           NT []    Aleksandar + []         - []           NT []  + []         - []           NT []    Long axis traction + []         - [x]           NT []  + []         - [x]           NT []    Other: + []         - []           NT []  + []         - []           NT []      KNEE SPECIAL TESTS Left Right   Anterior Drawer + []         - []           NT []  + []         - []           NT []    Posterior Drawer + []         - []           NT []  + []         - []           NT []    Lachman's Drawer + []         - []           NT []  + []         - []           NT []    Varus Stress (0 deg) + []         - [x]           NT []  + []         - [x]           NT []    Varus Stress (20 deg) + []         - [x]           NT []  + []         - [x]           NT []    Valgus Stress (0 deg) + []         - [x]           NT []  + []         - [x]           NT []    Valgus Stress (20 deg) + []         - [x]           NT []  + []         - [x]           NT []    Posterior Sag Sign + []         - []           NT []  + []         - []           NT []        MUSCLE LENGTH TESTING Normal Left Tight Right Tight   Glutes []  []  []    Piriformis []  [x]  [x]    ITB/TFL []  []  []    Hip Flexors []  []  []    Quads []  []  []    Hamstrings []  []  []     []  []  []        Functional Testing:   Timed Up and Go (TUG): 18.97 seconds    Assessment:  Pt comes in presenting with chronic lumbar and GIOVANNI knee pain. Lumbar ROM shows decrease and pain with motion with lumbar extension helping to decrease some pain. She also demonstrates GIOVANNI LE weakness and pain noted with MMT as well. TUG time shows fall risk and Mod SHON shows 32% disability at the low

## 2024-02-15 ENCOUNTER — HOSPITAL ENCOUNTER (OUTPATIENT)
Dept: PHYSICAL THERAPY | Age: 79
Setting detail: THERAPIES SERIES
Discharge: HOME | End: 2024-02-15
Payer: MEDICARE

## 2024-02-15 PROCEDURE — 97110 THERAPEUTIC EXERCISES: CPT

## 2024-02-15 NOTE — FLOWSHEET NOTE
understanding.  [] Demonstrates understanding.  [x] Needs review.  [] Demonstrates/verbalizes HEP/Ed previously given.       Specific Instructions for next treatment review HEP/add HEP, LE and core strengthening, stretching, manual if appropriate       Assessment: [] Progressing toward goals.    [] No change.     [x] Other: Initiated first treatment session post eval. Session focusing on stretching LE musculature and strengthening core and GIOVANNI LE to target increasing overall strength to help with pain. Tactile and verbal cues needed throughout with pt showing fair to good carry over. Pull downs needed constant cues for proper form with fair carry over. Provided with HEP. Eloy good at end of session. Will need more review over HEP and exercises to ensure proper form. No questions or concerns at end of session.    [x] Patient would continue to benefit from skilled physical therapy services in order to: decrease lumbar and GIOVANNI knee pain and improving strength ad ROM to work towards improving overall functional mobility and better ADL tolerance.     GOALS:   STG: (to be met in 8 treatments)  Pt will self report worst pain no greater than 7/10 in order to better tolerate ADLs/work activities with minimal dysfunction  Pt will improve AROM in lumbar side bending GIOVANNI to 50% in order to demonstrate ability to move/reach in all planes unrestricted at PLOF  Pt will improve AROM in lumbar rotation GIOVANNI to 75% in order to demonstrate ability to move/reach in all planes unrestricted at PLOF  Pt will improve AROM in lumbar extension to 50% in order to demonstrate ability to move/reach in all planes unrestricted at PLOF  LTG: (to be met in 16 treatments)  Pt will demonstrate improved GIOVANNI LE strength to 5-/5 or greater in order to demonstrate improved stability/strength necessary for unrestricted ADLs/work activities  Pt will decrease from 32% to 22% or less on Mod SHON in order to demonstrate improved functional tolerances at PLOF

## 2024-02-20 ENCOUNTER — HOSPITAL ENCOUNTER (OUTPATIENT)
Dept: PHYSICAL THERAPY | Age: 79
Setting detail: THERAPIES SERIES
Discharge: HOME | End: 2024-02-20
Payer: MEDICARE

## 2024-02-20 PROCEDURE — 97110 THERAPEUTIC EXERCISES: CPT

## 2024-02-20 NOTE — FLOWSHEET NOTE
[facility] Outpatient Physical Therapy   [address]   Phone: [phone]   Fax: [phone]    Physical Therapy Daily Treatment Note      Date:  2024  Patient Name:  Aster Edward    :  1945  MRN: [id number]  Physician: Harry Mcclure MD              Insurance: [facility] Medicare  TRACK CAP  Diagnosis:   M54.59 (ICD-10-CM) - Other low back pain   M17.0 (ICD-10-CM) - Bilateral primary osteoarthritis of knee      Onset Date: chronic   Next DrChela Appt: none at this time  Visit# / total visits: 3/16  Cancels/No Shows: 0/    Precautions: fall risk     Subjective:    Pt comes reporting that she was a little sore after last session but \"nothing I can't handle\" and some pain manly in the knees this morning.    Pain:  [x] Yes  [] No Location: GIOVANNI knees Pain Rating: (0-10 scale) 5/10  Pain altered Tx:  [] No  [] Yes  Action:  Comments:    Objective:  INTERVENTIONS  INTERVENTIONS  Reps/ Time Weight/ Level Completed  Today Comments          MODALITIES                      MANUAL        Long [place] L hip Distraction 3 min  x Added 2/20          EXERCISES        Mat Level:       Abdominal Bracing  12 x 5\"  x Increased hold time 2/20   Marching with Abdominal Bracing  10 x yellow x Added resistance 2/20   BKFO 10 x 2 yellow x Added resistance and sets 2/20   Clam shells GIOVANNI 10 x yellow x Added resistance 2/20   Piriformis Stretch GIOVANNI 3 x 30\"  x           Seated:       LAQ GIOVANNI 10 x 3\" 1# x Added 2/20   Knee Flexion GIOVANNI 10 x yellow x    Marches GIOVANNI 10 x 2      Ball Squeezes 10 x 3\"  x           Standing:       Pull Downs 10 x 2 yellow x Increased sets 2/20   Rows 10 x 2 yellow x Increased sets 2/20   Paloff Press 10 x yellow x    Hip 3 Way 10 x  x Added 2/20   Other:    Patient Education/Home Program :   Access Code: [email or web address]  URL: [email or web address]  Date: 02/15/2024  Prepared by: Chelsey Cr    Exercises  - Seated March  -  x daily - 7 x weekly - 2 sets - 10 reps  -

## 2024-02-22 ENCOUNTER — HOSPITAL ENCOUNTER (OUTPATIENT)
Dept: PHYSICAL THERAPY | Age: 79
Setting detail: THERAPIES SERIES
Discharge: HOME | End: 2024-02-22
Payer: MEDICARE

## 2024-02-22 PROCEDURE — 97110 THERAPEUTIC EXERCISES: CPT

## 2024-02-22 NOTE — FLOWSHEET NOTE
[facility] Outpatient Physical Therapy   [address]   Phone: [phone]   Fax: [phone]    Physical Therapy Daily Treatment Note      Date:  2024  Patient Name:  Aster Edward    :  1945  MRN: [id number]  Physician: Harry Mcclure MD              Insurance: [facility] Medicare  TRACK CAP  Diagnosis:   M54.59 (ICD-10-CM) - Other low back pain   M17.0 (ICD-10-CM) - Bilateral primary osteoarthritis of knee      Onset Date: chronic   Next  Appt: none at this time  Visit# / total visits:   Cancels/No Shows: 0/1    Precautions: fall risk     Subjective:    Patient reporting to therapy stating she is more tired than anything today.  Patient reporting she had no complaints from last session.  Pt reporting she has increased pain in her low back and hip when up and walking a lot.    Pain:  [x] Yes  [] No Location: Back and L hip. Pain Rating: (0-10 scale) 4/10  Pain altered Tx:  [] No  [] Yes  Action:  Comments:    Objective:  INTERVENTIONS  INTERVENTIONS  Reps/ Time Weight/ Level Completed  Today Comments          MODALITIES                      MANUAL        Long [place] L hip Distraction 3 min  x Added  performed GIOVANNI this session per pt's request          EXERCISES        Mat Level:       Abdominal Bracing  12 x 5\"  x Increased hold time    Marching with Abdominal Bracing  10 x 2 yellow x Added resistance  inc sets- may want to inc resistance next session   BKFO 10 x 2 yellow x Added resistance and sets    Clam shells GIOVANNI 10 x yellow x Added resistance    Piriformis Stretch GIOVANNI 3 x 30\"  x           Seated:       LAQ GIOVANNI 10 x2  3\" 1# x Added    Knee Flexion GIOVANNI 10 x 2 yellow x  inc sets   Marches GIOVANNI 10 x 2      Ball Squeezes 10 x 3\"             Standing:       Pull Downs 10 x 2 yellow x Increased sets 20   Rows 10 x 2 yellow x Increased sets    Paloff Press 10 x yellow x    Hip 3 Way 10 x  x Added

## 2024-02-25 ENCOUNTER — APPOINTMENT (OUTPATIENT)
Dept: GENERAL RADIOLOGY | Age: 79
End: 2024-02-25
Payer: MEDICARE

## 2024-02-25 ENCOUNTER — HOSPITAL ENCOUNTER (OUTPATIENT)
Age: 79
Setting detail: OBSERVATION
Discharge: ANOTHER HOSPITAL | End: 2024-02-27
Attending: EMERGENCY MEDICINE | Admitting: FAMILY MEDICINE
Payer: MEDICARE

## 2024-02-25 DIAGNOSIS — I50.23 ACUTE ON CHRONIC SYSTOLIC CONGESTIVE HEART FAILURE (HCC): Primary | ICD-10-CM

## 2024-02-25 DIAGNOSIS — R09.89 PULMONARY VASCULAR CONGESTION: ICD-10-CM

## 2024-02-25 DIAGNOSIS — S42.91XA TRAUMATIC CLOSED DISPLACED FRACTURE OF RIGHT SHOULDER WITH ANTERIOR DISLOCATION, INITIAL ENCOUNTER: ICD-10-CM

## 2024-02-25 LAB
ALBUMIN SERPL-MCNC: 4.2 G/DL (ref 3.5–5.2)
ALP SERPL-CCNC: 51 U/L (ref 35–104)
ALT SERPL-CCNC: 18 U/L (ref 5–33)
ANION GAP SERPL CALCULATED.3IONS-SCNC: 13 MMOL/L (ref 9–17)
AST SERPL-CCNC: 26 U/L
BASOPHILS # BLD: 0.1 K/UL (ref 0–0.2)
BASOPHILS NFR BLD: 1 % (ref 0–2)
BILIRUB SERPL-MCNC: 0.2 MG/DL (ref 0.3–1.2)
BNP SERPL-MCNC: 60 PG/ML
BUN SERPL-MCNC: 26 MG/DL (ref 8–23)
CALCIUM SERPL-MCNC: 9.1 MG/DL (ref 8.6–10.4)
CHLORIDE SERPL-SCNC: 100 MMOL/L (ref 98–107)
CO2 SERPL-SCNC: 24 MMOL/L (ref 20–31)
CREAT SERPL-MCNC: 0.8 MG/DL (ref 0.5–0.9)
EOSINOPHIL # BLD: 0.3 K/UL (ref 0–0.4)
EOSINOPHILS RELATIVE PERCENT: 4 % (ref 0–4)
ERYTHROCYTE [DISTWIDTH] IN BLOOD BY AUTOMATED COUNT: 14.5 % (ref 11.5–14.9)
GFR SERPL CREATININE-BSD FRML MDRD: >60 ML/MIN/1.73M2
GLUCOSE SERPL-MCNC: 173 MG/DL (ref 70–99)
HCT VFR BLD AUTO: 38.5 % (ref 36–46)
HGB BLD-MCNC: 12.4 G/DL (ref 12–16)
LYMPHOCYTES NFR BLD: 3.5 K/UL (ref 1–4.8)
LYMPHOCYTES RELATIVE PERCENT: 37 % (ref 24–44)
MCH RBC QN AUTO: 26.7 PG (ref 26–34)
MCHC RBC AUTO-ENTMCNC: 32.1 G/DL (ref 31–37)
MCV RBC AUTO: 83 FL (ref 80–100)
MONOCYTES NFR BLD: 0.5 K/UL (ref 0.1–1.3)
MONOCYTES NFR BLD: 5 % (ref 1–7)
NEUTROPHILS NFR BLD: 53 % (ref 36–66)
NEUTS SEG NFR BLD: 5.2 K/UL (ref 1.3–9.1)
PLATELET # BLD AUTO: 249 K/UL (ref 150–450)
PMV BLD AUTO: 8.3 FL (ref 6–12)
POTASSIUM SERPL-SCNC: 4.2 MMOL/L (ref 3.7–5.3)
PROT SERPL-MCNC: 6.7 G/DL (ref 6.4–8.3)
RBC # BLD AUTO: 4.63 M/UL (ref 4–5.2)
SODIUM SERPL-SCNC: 137 MMOL/L (ref 135–144)
TROPONIN I SERPL HS-MCNC: 12 NG/L (ref 0–14)
WBC OTHER # BLD: 9.5 K/UL (ref 3.5–11)

## 2024-02-25 PROCEDURE — 73030 X-RAY EXAM OF SHOULDER: CPT

## 2024-02-25 PROCEDURE — 85025 COMPLETE CBC W/AUTO DIFF WBC: CPT

## 2024-02-25 PROCEDURE — 96374 THER/PROPH/DIAG INJ IV PUSH: CPT

## 2024-02-25 PROCEDURE — 6360000002 HC RX W HCPCS: Performed by: EMERGENCY MEDICINE

## 2024-02-25 PROCEDURE — 2580000003 HC RX 258: Performed by: EMERGENCY MEDICINE

## 2024-02-25 PROCEDURE — 96375 TX/PRO/DX INJ NEW DRUG ADDON: CPT

## 2024-02-25 PROCEDURE — 71045 X-RAY EXAM CHEST 1 VIEW: CPT

## 2024-02-25 PROCEDURE — 99152 MOD SED SAME PHYS/QHP 5/>YRS: CPT

## 2024-02-25 PROCEDURE — 73060 X-RAY EXAM OF HUMERUS: CPT

## 2024-02-25 PROCEDURE — 2580000003 HC RX 258: Performed by: FAMILY MEDICINE

## 2024-02-25 PROCEDURE — G0378 HOSPITAL OBSERVATION PER HR: HCPCS

## 2024-02-25 PROCEDURE — 72072 X-RAY EXAM THORAC SPINE 3VWS: CPT

## 2024-02-25 PROCEDURE — 83880 ASSAY OF NATRIURETIC PEPTIDE: CPT

## 2024-02-25 PROCEDURE — 93005 ELECTROCARDIOGRAM TRACING: CPT | Performed by: EMERGENCY MEDICINE

## 2024-02-25 PROCEDURE — 36415 COLL VENOUS BLD VENIPUNCTURE: CPT

## 2024-02-25 PROCEDURE — 99285 EMERGENCY DEPT VISIT HI MDM: CPT

## 2024-02-25 PROCEDURE — 72100 X-RAY EXAM L-S SPINE 2/3 VWS: CPT

## 2024-02-25 PROCEDURE — 23650 CLTX SHO DSLC W/MNPJ WO ANES: CPT

## 2024-02-25 PROCEDURE — 2700000000 HC OXYGEN THERAPY PER DAY

## 2024-02-25 PROCEDURE — 84484 ASSAY OF TROPONIN QUANT: CPT

## 2024-02-25 PROCEDURE — 80053 COMPREHEN METABOLIC PANEL: CPT

## 2024-02-25 RX ORDER — POLYETHYLENE GLYCOL 3350 17 G/17G
17 POWDER, FOR SOLUTION ORAL DAILY PRN
Status: DISCONTINUED | OUTPATIENT
Start: 2024-02-25 | End: 2024-02-27 | Stop reason: HOSPADM

## 2024-02-25 RX ORDER — SODIUM CHLORIDE 0.9 % (FLUSH) 0.9 %
5-40 SYRINGE (ML) INJECTION EVERY 12 HOURS SCHEDULED
Status: DISCONTINUED | OUTPATIENT
Start: 2024-02-25 | End: 2024-02-27 | Stop reason: HOSPADM

## 2024-02-25 RX ORDER — POTASSIUM CHLORIDE 7.45 MG/ML
10 INJECTION INTRAVENOUS PRN
Status: DISCONTINUED | OUTPATIENT
Start: 2024-02-25 | End: 2024-02-27 | Stop reason: HOSPADM

## 2024-02-25 RX ORDER — MAGNESIUM SULFATE HEPTAHYDRATE 40 MG/ML
2000 INJECTION, SOLUTION INTRAVENOUS PRN
Status: DISCONTINUED | OUTPATIENT
Start: 2024-02-25 | End: 2024-02-27 | Stop reason: HOSPADM

## 2024-02-25 RX ORDER — FUROSEMIDE 10 MG/ML
40 INJECTION INTRAMUSCULAR; INTRAVENOUS ONCE
Status: COMPLETED | OUTPATIENT
Start: 2024-02-25 | End: 2024-02-25

## 2024-02-25 RX ORDER — ENOXAPARIN SODIUM 100 MG/ML
40 INJECTION SUBCUTANEOUS DAILY
Status: DISCONTINUED | OUTPATIENT
Start: 2024-02-26 | End: 2024-02-27 | Stop reason: HOSPADM

## 2024-02-25 RX ORDER — SODIUM CHLORIDE 0.9 % (FLUSH) 0.9 %
5-40 SYRINGE (ML) INJECTION PRN
Status: DISCONTINUED | OUTPATIENT
Start: 2024-02-25 | End: 2024-02-27 | Stop reason: HOSPADM

## 2024-02-25 RX ORDER — ONDANSETRON 2 MG/ML
4 INJECTION INTRAMUSCULAR; INTRAVENOUS EVERY 6 HOURS PRN
Status: DISCONTINUED | OUTPATIENT
Start: 2024-02-25 | End: 2024-02-27 | Stop reason: HOSPADM

## 2024-02-25 RX ORDER — CALCIUM CARBONATE/VITAMIN D3 600 MG-10
1 TABLET ORAL DAILY
Status: DISCONTINUED | OUTPATIENT
Start: 2024-02-26 | End: 2024-02-27 | Stop reason: HOSPADM

## 2024-02-25 RX ORDER — SODIUM CHLORIDE 9 MG/ML
INJECTION, SOLUTION INTRAVENOUS PRN
Status: DISCONTINUED | OUTPATIENT
Start: 2024-02-25 | End: 2024-02-27 | Stop reason: HOSPADM

## 2024-02-25 RX ORDER — POTASSIUM CHLORIDE 20 MEQ/1
40 TABLET, EXTENDED RELEASE ORAL PRN
Status: DISCONTINUED | OUTPATIENT
Start: 2024-02-25 | End: 2024-02-27 | Stop reason: HOSPADM

## 2024-02-25 RX ORDER — MELOXICAM 15 MG/1
15 TABLET ORAL DAILY
Status: DISCONTINUED | OUTPATIENT
Start: 2024-02-26 | End: 2024-02-27 | Stop reason: HOSPADM

## 2024-02-25 RX ORDER — ACETAMINOPHEN 325 MG/1
650 TABLET ORAL EVERY 6 HOURS PRN
Status: DISCONTINUED | OUTPATIENT
Start: 2024-02-25 | End: 2024-02-27 | Stop reason: HOSPADM

## 2024-02-25 RX ORDER — SODIUM CHLORIDE 9 MG/ML
INJECTION, SOLUTION INTRAVENOUS CONTINUOUS
Status: DISCONTINUED | OUTPATIENT
Start: 2024-02-25 | End: 2024-02-25

## 2024-02-25 RX ORDER — LANOLIN ALCOHOL/MO/W.PET/CERES
3 CREAM (GRAM) TOPICAL NIGHTLY PRN
Status: DISCONTINUED | OUTPATIENT
Start: 2024-02-25 | End: 2024-02-27 | Stop reason: HOSPADM

## 2024-02-25 RX ORDER — ACETAMINOPHEN 650 MG/1
650 SUPPOSITORY RECTAL EVERY 6 HOURS PRN
Status: DISCONTINUED | OUTPATIENT
Start: 2024-02-25 | End: 2024-02-27 | Stop reason: HOSPADM

## 2024-02-25 RX ORDER — ONDANSETRON 2 MG/ML
8 INJECTION INTRAMUSCULAR; INTRAVENOUS ONCE
Status: COMPLETED | OUTPATIENT
Start: 2024-02-25 | End: 2024-02-25

## 2024-02-25 RX ORDER — ONDANSETRON 4 MG/1
4 TABLET, ORALLY DISINTEGRATING ORAL EVERY 8 HOURS PRN
Status: DISCONTINUED | OUTPATIENT
Start: 2024-02-25 | End: 2024-02-27 | Stop reason: HOSPADM

## 2024-02-25 RX ADMIN — PROPOFOL 40 MG: 10 INJECTION, EMULSION INTRAVENOUS at 20:45

## 2024-02-25 RX ADMIN — Medication 10 ML: at 23:59

## 2024-02-25 RX ADMIN — SODIUM CHLORIDE 100 ML/HR: 9 INJECTION, SOLUTION INTRAVENOUS at 20:08

## 2024-02-25 RX ADMIN — ONDANSETRON 8 MG: 2 INJECTION INTRAMUSCULAR; INTRAVENOUS at 18:42

## 2024-02-25 RX ADMIN — FUROSEMIDE 40 MG: 10 INJECTION, SOLUTION INTRAMUSCULAR; INTRAVENOUS at 21:36

## 2024-02-25 RX ADMIN — HYDROMORPHONE HYDROCHLORIDE 0.5 MG: 1 INJECTION, SOLUTION INTRAMUSCULAR; INTRAVENOUS; SUBCUTANEOUS at 18:37

## 2024-02-25 ASSESSMENT — PAIN SCALES - GENERAL
PAINLEVEL_OUTOF10: 2
PAINLEVEL_OUTOF10: 0
PAINLEVEL_OUTOF10: 10
PAINLEVEL_OUTOF10: 0

## 2024-02-25 ASSESSMENT — PAIN DESCRIPTION - ORIENTATION
ORIENTATION: RIGHT

## 2024-02-25 ASSESSMENT — PAIN DESCRIPTION - PAIN TYPE: TYPE: ACUTE PAIN

## 2024-02-25 ASSESSMENT — PAIN - FUNCTIONAL ASSESSMENT
PAIN_FUNCTIONAL_ASSESSMENT: PREVENTS OR INTERFERES SOME ACTIVE ACTIVITIES AND ADLS
PAIN_FUNCTIONAL_ASSESSMENT: 0-10

## 2024-02-25 ASSESSMENT — PAIN DESCRIPTION - LOCATION
LOCATION: SHOULDER
LOCATION: ARM
LOCATION: SHOULDER
LOCATION: SHOULDER

## 2024-02-25 ASSESSMENT — PAIN DESCRIPTION - FREQUENCY: FREQUENCY: CONTINUOUS

## 2024-02-25 ASSESSMENT — PAIN DESCRIPTION - DESCRIPTORS
DESCRIPTORS: SHOOTING
DESCRIPTORS: ACHING

## 2024-02-25 ASSESSMENT — LIFESTYLE VARIABLES: HOW OFTEN DO YOU HAVE A DRINK CONTAINING ALCOHOL: NEVER

## 2024-02-25 NOTE — ED TRIAGE NOTES
Mode of arrival (squad #, walk in, police, etc) : squad        Chief complaint(s): R. Arm pain        Arrival Note (brief scenario, treatment PTA, etc).: patient had a fall today in her kitchen, patient hit her arm. Patient never had loss of consciousness and did not hit her head. Patient is not on blood thinners.         C= \"Have you ever felt that you should Cut down on your drinking?\"  No  A= \"Have people Annoyed you by criticizing your drinking?\"  No  G= \"Have you ever felt bad or Guilty about your drinking?\"  No  E= \"Have you ever had a drink as an Eye-opener first thing in the morning to steady your nerves or to help a hangover?\"  No      Deferred []      Reason for deferring: N/A    *If yes to two or more: probable alcohol abuse.*

## 2024-02-25 NOTE — ED NOTES
The following labs labeled with pt sticker and tubed to lab:     [x] Blue     [x] Lavender   [] on ice  [x] Green/yellow  [] Green/black [] on ice  [] Yellow  [] Red  [] Pink      [] COVID-19 swab    [] Rapid  [] PCR  [] Flu swab  [] Peds Viral Panel     [] Urine Sample  [] Pelvic Cultures  [] Blood Cultures

## 2024-02-26 ENCOUNTER — HOSPITAL ENCOUNTER (OUTPATIENT)
Age: 79
Setting detail: OBSERVATION
Discharge: HOME | End: 2024-02-28
Payer: MEDICARE

## 2024-02-26 ENCOUNTER — APPOINTMENT (OUTPATIENT)
Dept: NUCLEAR MEDICINE | Age: 79
End: 2024-02-26
Payer: MEDICARE

## 2024-02-26 PROBLEM — S43.014A: Status: ACTIVE | Noted: 2024-02-26

## 2024-02-26 LAB
ECHO BSA: 1.92 M2
EKG ATRIAL RATE: 64 BPM
EKG P AXIS: 73 DEGREES
EKG P-R INTERVAL: 212 MS
EKG Q-T INTERVAL: 422 MS
EKG QRS DURATION: 92 MS
EKG QTC CALCULATION (BAZETT): 435 MS
EKG R AXIS: -29 DEGREES
EKG T AXIS: 17 DEGREES
EKG VENTRICULAR RATE: 64 BPM
STRESS BASELINE HR: 59 BPM
STRESS ESTIMATED WORKLOAD: 1 METS
STRESS PEAK DIAS BP: 60 MMHG
STRESS PEAK SYS BP: 144 MMHG
STRESS PERCENT HR ACHIEVED: 70 %
STRESS POST PEAK HR: 101 BPM
STRESS RATE PRESSURE PRODUCT: NORMAL BPM*MMHG
STRESS STAGE RECOVERY 1 BP: NORMAL MMHG
STRESS STAGE RECOVERY 1 DURATION: 1 MIN:SEC
STRESS STAGE RECOVERY 1 HR: 80 BPM
STRESS STAGE RECOVERY 2 BP: NORMAL MMHG
STRESS STAGE RECOVERY 2 DURATION: 7 MIN:SEC
STRESS STAGE RECOVERY 2 HR: 77 BPM
STRESS TARGET HR: 144 BPM

## 2024-02-26 PROCEDURE — 6360000002 HC RX W HCPCS: Performed by: FAMILY MEDICINE

## 2024-02-26 PROCEDURE — 97530 THERAPEUTIC ACTIVITIES: CPT

## 2024-02-26 PROCEDURE — G0378 HOSPITAL OBSERVATION PER HR: HCPCS

## 2024-02-26 PROCEDURE — 97116 GAIT TRAINING THERAPY: CPT

## 2024-02-26 PROCEDURE — 99223 1ST HOSP IP/OBS HIGH 75: CPT | Performed by: FAMILY MEDICINE

## 2024-02-26 PROCEDURE — 78452 HT MUSCLE IMAGE SPECT MULT: CPT

## 2024-02-26 PROCEDURE — 3430000000 HC RX DIAGNOSTIC RADIOPHARMACEUTICAL: Performed by: FAMILY MEDICINE

## 2024-02-26 PROCEDURE — 93017 CV STRESS TEST TRACING ONLY: CPT

## 2024-02-26 PROCEDURE — 6370000000 HC RX 637 (ALT 250 FOR IP): Performed by: FAMILY MEDICINE

## 2024-02-26 PROCEDURE — A9500 TC99M SESTAMIBI: HCPCS | Performed by: FAMILY MEDICINE

## 2024-02-26 PROCEDURE — 6370000000 HC RX 637 (ALT 250 FOR IP): Performed by: INTERNAL MEDICINE

## 2024-02-26 PROCEDURE — 99221 1ST HOSP IP/OBS SF/LOW 40: CPT | Performed by: INTERNAL MEDICINE

## 2024-02-26 PROCEDURE — 97166 OT EVAL MOD COMPLEX 45 MIN: CPT

## 2024-02-26 PROCEDURE — 93010 ELECTROCARDIOGRAM REPORT: CPT | Performed by: INTERNAL MEDICINE

## 2024-02-26 PROCEDURE — 97162 PT EVAL MOD COMPLEX 30 MIN: CPT

## 2024-02-26 PROCEDURE — 93016 CV STRESS TEST SUPVJ ONLY: CPT | Performed by: RADIOLOGY

## 2024-02-26 PROCEDURE — 2580000003 HC RX 258: Performed by: FAMILY MEDICINE

## 2024-02-26 RX ORDER — ATROPINE SULFATE 0.1 MG/ML
0.5 INJECTION INTRAVENOUS EVERY 5 MIN PRN
Status: ACTIVE | OUTPATIENT
Start: 2024-02-26 | End: 2024-02-26

## 2024-02-26 RX ORDER — REGADENOSON 0.08 MG/ML
0.4 INJECTION, SOLUTION INTRAVENOUS
Status: COMPLETED | OUTPATIENT
Start: 2024-02-26 | End: 2024-02-26

## 2024-02-26 RX ORDER — AMINOPHYLLINE 25 MG/ML
50 INJECTION, SOLUTION INTRAVENOUS PRN
Status: ACTIVE | OUTPATIENT
Start: 2024-02-26 | End: 2024-02-26

## 2024-02-26 RX ORDER — TETRAKIS(2-METHOXYISOBUTYLISOCYANIDE)COPPER(I) TETRAFLUOROBORATE 1 MG/ML
30 INJECTION, POWDER, LYOPHILIZED, FOR SOLUTION INTRAVENOUS
Status: COMPLETED | OUTPATIENT
Start: 2024-02-26 | End: 2024-02-26

## 2024-02-26 RX ORDER — METOPROLOL TARTRATE 1 MG/ML
5 INJECTION, SOLUTION INTRAVENOUS EVERY 5 MIN PRN
Status: ACTIVE | OUTPATIENT
Start: 2024-02-26 | End: 2024-02-26

## 2024-02-26 RX ORDER — ATORVASTATIN CALCIUM 40 MG/1
40 TABLET, FILM COATED ORAL NIGHTLY
Status: DISCONTINUED | OUTPATIENT
Start: 2024-02-26 | End: 2024-02-27 | Stop reason: HOSPADM

## 2024-02-26 RX ORDER — NITROGLYCERIN 0.4 MG/1
0.4 TABLET SUBLINGUAL EVERY 5 MIN PRN
Status: ACTIVE | OUTPATIENT
Start: 2024-02-26 | End: 2024-02-26

## 2024-02-26 RX ORDER — SODIUM CHLORIDE 9 MG/ML
500 INJECTION, SOLUTION INTRAVENOUS CONTINUOUS PRN
Status: ACTIVE | OUTPATIENT
Start: 2024-02-26 | End: 2024-02-26

## 2024-02-26 RX ORDER — SODIUM CHLORIDE 0.9 % (FLUSH) 0.9 %
5-40 SYRINGE (ML) INJECTION PRN
Status: ACTIVE | OUTPATIENT
Start: 2024-02-26 | End: 2024-02-26

## 2024-02-26 RX ORDER — TETRAKIS(2-METHOXYISOBUTYLISOCYANIDE)COPPER(I) TETRAFLUOROBORATE 1 MG/ML
10 INJECTION, POWDER, LYOPHILIZED, FOR SOLUTION INTRAVENOUS
Status: COMPLETED | OUTPATIENT
Start: 2024-02-26 | End: 2024-02-26

## 2024-02-26 RX ORDER — SODIUM CHLORIDE 0.9 % (FLUSH) 0.9 %
10 SYRINGE (ML) INJECTION PRN
Status: DISCONTINUED | OUTPATIENT
Start: 2024-02-26 | End: 2024-02-27 | Stop reason: HOSPADM

## 2024-02-26 RX ORDER — ALBUTEROL SULFATE 90 UG/1
2 AEROSOL, METERED RESPIRATORY (INHALATION) PRN
Status: ACTIVE | OUTPATIENT
Start: 2024-02-26 | End: 2024-02-26

## 2024-02-26 RX ADMIN — Medication 36.9 MILLICURIE: at 13:20

## 2024-02-26 RX ADMIN — SODIUM CHLORIDE, PRESERVATIVE FREE 10 ML: 5 INJECTION INTRAVENOUS at 13:20

## 2024-02-26 RX ADMIN — Medication 10 ML: at 19:59

## 2024-02-26 RX ADMIN — MELOXICAM 15 MG: 15 TABLET ORAL at 17:43

## 2024-02-26 RX ADMIN — Medication 10 ML: at 12:14

## 2024-02-26 RX ADMIN — REGADENOSON 0.4 MG: 0.08 INJECTION, SOLUTION INTRAVENOUS at 09:52

## 2024-02-26 RX ADMIN — Medication 16.4 MILLICURIE: at 09:55

## 2024-02-26 RX ADMIN — CALCIUM CARBONATE 600 MG (1,500 MG)-VITAMIN D3 400 UNIT TABLET 1 TABLET: at 12:13

## 2024-02-26 RX ADMIN — ATORVASTATIN CALCIUM 40 MG: 40 TABLET, FILM COATED ORAL at 19:59

## 2024-02-26 ASSESSMENT — PAIN SCALES - GENERAL: PAINLEVEL_OUTOF10: 0

## 2024-02-26 NOTE — ED NOTES
A/O X 3. Skin warm and dry. Respirations quiet and easy, lungs diminished t/o. Heart sounds regular, monitor shows NSR, Denies chest pain. Bowel sounds present x 4 quadrants, abdomen is soft, non-tender. Purwick in place and draining a large amt of clear, yellow urine. Peripheral pulses palpable, neg edema bilaterally. Has sling and swathe on right upper extremity. Resting quietly.

## 2024-02-26 NOTE — H&P
Family Medicine Admit Note    PCP: Kari Gibbons MD    Date of Admission: 2/25/2024    Date of Service: Pt seen/examined on 2/26/2024 and Placed in Observation.    Chief Complaint:  right arm pain      History Of Present Illness:   The patient is a 78 y.o. female who presents to [facility] with right arm pain after a slip and fall at home.     Chest XR done in  showed pulmonary venous congestion and cardiomegaly as well as perihilar pulmonary consolidation. She told the  physician she had an episode of chest tightness and dyspnea last week that resolved spontaneously.      Past Medical History:        Diagnosis Date    Acute gouty arthropathy     Arthritis     Body mass index 36.0-36.9, adult     CAD (coronary artery disease)     Hypertension     hx of    Other abnormal glucose     Other specified urticaria     Unspecified asthma(493.90)     Unspecified hypertensive heart disease without heart failure     Unspecified vitamin D deficiency     Wears glasses        Past Surgical History:        Procedure Laterality Date    BUNIONECTOMY Right     CARDIAC CATHETERIZATION      collagen sponge femoral placed and absorbed    HYSTERECTOMY (CERVIX STATUS UNKNOWN)  10/02/2018    SHOULDER ARTHROSCOPY Right 07/14/2016    Acromioplasty, SAD, Mini-open rotator cuff repair.    SHOULDER ARTHROSCOPY Right 07/14/2016    TUMOR EXCISION      fatty tumor left mid back       Medications Prior to Admission:    Prior to Admission medications    Medication Sig Start Date End Date Taking? Authorizing Provider   meloxicam (MOBIC) 15 MG tablet Take 1 tablet by mouth daily 8/1/23   Kari Gibbons MD   Calcium Carbonate-Vitamin D (CALTRATE 600+D PO) Take 1 tablet by mouth 2 times daily    ProviderJustino MD       Allergies:  Aspirin, Norvasc [amlodipine besylate], Penicillins, and Vioxx [rofecoxib]    Social History:  The patient currently lives at home    TOBACCO:   reports that she has never smoked. She has

## 2024-02-26 NOTE — CONSULTS
[facility]    Consult                        Today's Date: 2/26/2024  Patient Name: Aster Edward  Date of admission: 2/25/2024  6:17 PM  Patient's age: 78 y.o., 1945  Admission Dx: Acute on chronic systolic congestive heart failure (HCC) [I50.23]  Pulmonary vascular congestion [R09.89]  Traumatic closed displaced fracture of right shoulder with anterior dislocation, initial encounter [S42.91XA]    Reason for Consult:  Cardiac evaluation    Requesting Physician: Kari Gibbons MD    CHIEF COMPLAINT:  Fall    History Obtained From:  patient, electronic medical record    HISTORY OF PRESENT ILLNESS:      The patient is a 78 y.o. Mediterranean female who is admitted to the hospital for fall after she slipped. She reported an episode of chest tightness and dyspnea last week. Chest xray showed pulmonary venous congestion. Pro BNP is normal at 60. Hs trop normal.     Past Medical History:   has a past medical history of Acute gouty arthropathy, Arthritis, Body mass index 36.0-36.9, adult, CAD (coronary artery disease), Hypertension, Other abnormal glucose, Other specified urticaria, Unspecified asthma(493.90), Unspecified hypertensive heart disease without heart failure, Unspecified vitamin D deficiency, and Wears glasses.    Past Surgical History:   has a past surgical history that includes Cardiac catheterization; Bunionectomy (Right); tumor excision; Shoulder arthroscopy (Right, 07/14/2016); Shoulder arthroscopy (Right, 07/14/2016); and Hysterectomy (10/02/2018).     Home Medications:    Prior to Admission medications    Medication Sig Start Date End Date Taking? Authorizing Provider   meloxicam (MOBIC) 15 MG tablet Take 1 tablet by mouth daily 8/1/23   Kari Gibbons MD   Calcium Carbonate-Vitamin D (CALTRATE 600+D PO) Take 1 tablet by mouth 2 times daily    ProviderJustino MD       Allergies:  Aspirin, Norvasc [amlodipine besylate], Penicillins, and Vioxx

## 2024-02-26 NOTE — ACP (ADVANCE CARE PLANNING)
Conversation Outcomes:  ACP discussion completed    Follow-up plan:    [] Schedule follow-up conversation to continue planning  [] Referred individual to Provider for additional questions/concerns   [] Advised patient/agent/surrogate to review completed ACP document and update if needed with changes in condition, patient preferences or care setting    [] This note routed to one or more involved healthcare providers        Asked for ACP documents to be brought in to be scanned.  Electronically signed by Katie Martell RN on 2/26/2024 at 3:11 PM

## 2024-02-26 NOTE — PLAN OF CARE
Problem: Pain  Goal: Verbalizes/displays adequate comfort level or baseline comfort level  Outcome: Progressing  Flowsheets (Taken 2/25/2024 7448)  Verbalizes/displays adequate comfort level or baseline comfort level: Encourage patient to monitor pain and request assistance

## 2024-02-26 NOTE — ED NOTES
Admission Dx: chf, right shoulder dislocation, s/p fall    Pts Chief Complaints on Arrival: s/p fall, right shoulder pain    ADL's - Total assistance    Pending Diagnostics:  n/a    Residence PTA: single story home    Special Considerations/Circumstances:  n/a    Vitals: Current vital signs:  /62   Pulse 62   Temp 98.2 °F (36.8 °C) (Oral)   Resp 16   Ht 1.626 m (5' 4\")   Wt 81.6 kg (180 lb)   SpO2 92%   BMI 30.90 kg/m²                MEWS Score: 1

## 2024-02-26 NOTE — SEDATION DOCUMENTATION
Post reduction x-ray done and looks good per Dr. Barry   [address] (ESR)      [place] MD CERTIFICATION [facility] PATIENT   2. Edema, unspecified type R60.9 782.3 REFERRAL TO HOME HEALTH      METABOLIC PANEL, COMPREHENSIVE      T4, FREE      TSH 3RD GENERATION      [place] MD CERTIFICATION [facility] PATIENT   3. Neuropathy G62.9 355.9 REFERRAL TO PHYSICIAL MEDICINE REHAB      [place] MD CERTIFICATION A PATIENT   4. Essential hypertension O41 192.1 [place] MD CERTIFICATION A PATIENT   5. Type 2 diabetes with nephropathy ([facility]) E11.21 250.40 HEMOGLOBIN A1C WITH EAG     583.81 LIPID PANEL      [place] MD CERTIFICATION [facility] PATIENT   6. SIADH (syndrome of inappropriate ADH production) ([facility]) P33.6 724.1 [place] MD CERTIFICATION A PATIENT      METABOLIC PANEL, BASIC   7. Depression with anxiety J04.2 728.8 [place] MD CERTIFICATION [facility] PATIENT   8. Complex regional pain syndrome type 1 of right lower extremity G90.521 337.22 REFERRAL  Nathan Alvarado MD CERTIFICATION [facility] PATIENT   9. Vitamin D deficiency E55.9 268.9 VITAMIN D, 25 HYDROXY      [place] MD CERTIFICATION [facility] PATIENT   10.  B12 deficiency E53.8 266.2 VITAMIN K60      [place] MD CERTIFICATION [facility] PATIENT   11. Iron deficiency anemia, unspecified iron deficiency anemia type D50.9 280.9 CBC WITH AUTOMATED DIFF      FERRITIN      IRON PROFILE      [place] MD CERTIFICATION [facility] PATIENT   12. Bone fracture T14. 8XXA 829.0 DEXA BONE DENSITY STUDY AXIAL      [place] MD CERTIFICATION [facility] PATIENT   15. Encounter for screening mammogram for breast cancer Z12.31 V76.12 JULY MAMMO BI SCREENING INCL CAD      [place] MD CERTIFICATION [facility] PATIENT   15.  

## 2024-02-26 NOTE — ED PROVIDER NOTES
Ortho Injury    Date/Time: 2/25/2024 8:47 PM    Performed by: Jayme Card MD  Authorized by: Eliseo Barry MD  Consent: Verbal consent obtained.  Risks and benefits: risks, benefits and alternatives were discussed  Consent given by: patient  Patient understanding: patient states understanding of the procedure being performed  Test results: test results available and properly labeled  Site marked: the operative site was marked  Imaging studies: imaging studies available  Required items: required blood products, implants, devices, and special equipment available  Patient identity confirmed: verbally with patient and arm band  Injury location: shoulder  Location details: right shoulder  Injury type: dislocation  Dislocation type: anterior  Hill-Sachs deformity: no  Chronicity: new  Pre-procedure neurovascular assessment: neurovascularly intact  Pre-procedure distal perfusion: normal  Pre-procedure neurological function: normal  Pre-procedure range of motion: reduced    Anesthesia:  Local anesthesia used: no    Sedation:  Patient sedated: yes  Sedation type: moderate (conscious) sedation  Sedatives: propofol (40mg of propofol bolus given once by Dr. Barry)  Sedation start date/time: 2/25/2024 8:35 PM  Sedation end date/time: 2/25/2024 8:38 PM  Vitals: Vital signs were monitored during sedation.    Manipulation performed: yes  Reduction method: external rotation  Reduction successful: yes  X-ray confirmed reduction: yes  Immobilization: sling  Post-procedure neurovascular assessment: post-procedure neurovascularly intact  Post-procedure distal perfusion: normal  Post-procedure neurological function: normal  Post-procedure range of motion: unchanged  Patient tolerance: patient tolerated the procedure well with no immediate complications  Comments: For entirety of sedation.  RT was present at bedside, reduction was being performed by myself.  Procedural sedation was performed by Dr. Barry.  BVM present in the 
L-spine injury.    Pertinent Comorbid Conditions:  arthritis    2)  Data Reviewed and Analyzed  (Lab and radiology tests/orders below in next section)      3)  Treatment and Disposition      ED Course as of 02/25/24 2121   Sun Feb 25, 2024 2038 40 mg propofol administered at 8:35 PM. [WM]   2108 DW Dr Gibbons, will admit for diuresis cardiology eval and echo  Patient agrees with plan  Shoulder has been reduced  She is placed in a sling [WM]   2108 I interpreted the twelve-lead EKG, LVH changes present.  No ST elevation or depression.  Normal rate, intervals normal except for MN interval 212 [WM]      ED Course User Index  [WM] Eliseo Barry MD       Patient repeat assessment: Pain well-controlled.  She is in a shoulder immobilizer.    See resident note for shoulder reduction and procedural sedation procedure note.  Signed consent was completed for the procedure prior to it being done.  Mallampati 2 airway.    Postreduction x-ray shows the anterior shoulder dislocation has been reduced.    She is admitted for CHF, suspect new onset CHF, she needs an echo diuresis cardiology consult.  Spoke with her PCP for admission.  Patient agrees with this plan as well.  Patient does admit to an episode of chest tightness and dyspnea last week that resolved.      Disposition discussion with patient/family, Shared Decision Making:  admit      DATA FOR LAB AND RADIOLOGY TESTS ORDERED BELOW ARE REVIEWED BY THE ED CLINICIAN:    RADIOLOGY: All x-rays, CT, MRI, and formal ultrasound images (except ED bedside ultrasound) are read by the radiologist, see reports below, unless otherwise noted in MDM or here.  Reports below are reviewed by myself.  XR SHOULDER RIGHT (MIN 2 VIEWS)   Preliminary Result   Satisfactory alignment status post glenohumeral joint reduction.         XR CHEST PORTABLE   Final Result   1. Right-sided anterior shoulder dislocation.   2. Cardiomegaly with pulmonary venous congestion.   3. Perihilar pulmonary

## 2024-02-27 ENCOUNTER — HOSPITAL ENCOUNTER (OUTPATIENT)
Age: 79
Setting detail: OUTPATIENT SURGERY
Discharge: HOME | End: 2024-02-27
Attending: INTERNAL MEDICINE | Admitting: INTERNAL MEDICINE
Payer: MEDICARE

## 2024-02-27 ENCOUNTER — APPOINTMENT (OUTPATIENT)
Dept: PHYSICAL THERAPY | Age: 79
End: 2024-02-27
Payer: MEDICARE

## 2024-02-27 VITALS
HEART RATE: 72 BPM | OXYGEN SATURATION: 95 % | SYSTOLIC BLOOD PRESSURE: 144 MMHG | RESPIRATION RATE: 15 BRPM | DIASTOLIC BLOOD PRESSURE: 66 MMHG

## 2024-02-27 VITALS
HEART RATE: 72 BPM | TEMPERATURE: 98 F | DIASTOLIC BLOOD PRESSURE: 63 MMHG | HEIGHT: 64 IN | WEIGHT: 179.23 LBS | BODY MASS INDEX: 30.6 KG/M2 | SYSTOLIC BLOOD PRESSURE: 131 MMHG | RESPIRATION RATE: 18 BRPM | OXYGEN SATURATION: 93 %

## 2024-02-27 DIAGNOSIS — R94.39 ABNORMAL STRESS TEST: ICD-10-CM

## 2024-02-27 PROCEDURE — 99152 MOD SED SAME PHYS/QHP 5/>YRS: CPT | Performed by: INTERNAL MEDICINE

## 2024-02-27 PROCEDURE — 93458 L HRT ARTERY/VENTRICLE ANGIO: CPT | Performed by: INTERNAL MEDICINE

## 2024-02-27 PROCEDURE — 2500000003 HC RX 250 WO HCPCS: Performed by: INTERNAL MEDICINE

## 2024-02-27 PROCEDURE — 7100000010 HC PHASE II RECOVERY - FIRST 15 MIN: Performed by: INTERNAL MEDICINE

## 2024-02-27 PROCEDURE — G0378 HOSPITAL OBSERVATION PER HR: HCPCS

## 2024-02-27 PROCEDURE — 2580000003 HC RX 258: Performed by: FAMILY MEDICINE

## 2024-02-27 PROCEDURE — 6360000004 HC RX CONTRAST MEDICATION: Performed by: INTERNAL MEDICINE

## 2024-02-27 PROCEDURE — 99153 MOD SED SAME PHYS/QHP EA: CPT | Performed by: INTERNAL MEDICINE

## 2024-02-27 PROCEDURE — 6360000002 HC RX W HCPCS: Performed by: INTERNAL MEDICINE

## 2024-02-27 PROCEDURE — C1894 INTRO/SHEATH, NON-LASER: HCPCS | Performed by: INTERNAL MEDICINE

## 2024-02-27 PROCEDURE — C1769 GUIDE WIRE: HCPCS | Performed by: INTERNAL MEDICINE

## 2024-02-27 PROCEDURE — 7100000011 HC PHASE II RECOVERY - ADDTL 15 MIN: Performed by: INTERNAL MEDICINE

## 2024-02-27 PROCEDURE — 2709999900 HC NON-CHARGEABLE SUPPLY: Performed by: INTERNAL MEDICINE

## 2024-02-27 PROCEDURE — 99239 HOSP IP/OBS DSCHRG MGMT >30: CPT | Performed by: FAMILY MEDICINE

## 2024-02-27 RX ORDER — SODIUM CHLORIDE 9 MG/ML
INJECTION, SOLUTION INTRAVENOUS PRN
Status: CANCELLED | OUTPATIENT
Start: 2024-02-27

## 2024-02-27 RX ORDER — MIDAZOLAM HYDROCHLORIDE 1 MG/ML
INJECTION INTRAMUSCULAR; INTRAVENOUS PRN
Status: DISCONTINUED | OUTPATIENT
Start: 2024-02-27 | End: 2024-02-27 | Stop reason: HOSPADM

## 2024-02-27 RX ORDER — SODIUM CHLORIDE 0.9 % (FLUSH) 0.9 %
5-40 SYRINGE (ML) INJECTION EVERY 12 HOURS SCHEDULED
Status: CANCELLED | OUTPATIENT
Start: 2024-02-27

## 2024-02-27 RX ORDER — SODIUM CHLORIDE 0.9 % (FLUSH) 0.9 %
5-40 SYRINGE (ML) INJECTION PRN
Status: CANCELLED | OUTPATIENT
Start: 2024-02-27

## 2024-02-27 RX ORDER — ACETAMINOPHEN 325 MG/1
650 TABLET ORAL EVERY 4 HOURS PRN
Status: CANCELLED | OUTPATIENT
Start: 2024-02-27

## 2024-02-27 RX ORDER — ATORVASTATIN CALCIUM 10 MG/1
10 TABLET, FILM COATED ORAL DAILY
Qty: 30 TABLET | Refills: 3 | Status: SHIPPED | OUTPATIENT
Start: 2024-02-27

## 2024-02-27 RX ORDER — MELOXICAM 15 MG/1
15 TABLET ORAL DAILY
OUTPATIENT
Start: 2024-02-27

## 2024-02-27 RX ADMIN — Medication 10 ML: at 09:10

## 2024-02-27 NOTE — PROGRESS NOTES
02/26/24 1120   Encounter Summary   Encounter Overview/Reason  Attempted Encounter   Service Provided For: Patient not available  (Patient is with therapy.)   Referral/Consult From: Nurse   Assessment/Intervention/Outcome   Assessment Unable to assess  (Patient unavailable with therapy.)       
Attempted to call report on patient to [place] [facility].  Voicemail sent.  
Attempted to call report to [facility]. Message left for a second time.  Left phone number to call back.        
Dr Gibbons contacted through [employer] game platform for discharge/ readmit orders for cardiac cath at  [facility].  
[facility]   OCCUPATIONAL THERAPY MISSED TREATMENT NOTE   INPATIENT   Date: 24  Patient Name: Aster Edward       Room:   MRN: [id number]   Account #: [id number]    : 1945  (78 y.o.)  Gender: female   Referring Practitioner: Kari Gibbons MD  Diagnosis: Pulmonary vascular congestion             REASON FOR MISSED TREATMENT:  Hold treatment per nursing request   -   Surgery  - per RN Glen pt will be going to cath lab for heart cath today. OT will continue to follow.    0825         Electronically signed by CAROLINA Costa on 24 at 9:11 AM EST    
Patient discharge with [facility] to have cath done at [facility].  
Patient transferred to room 2073 from PCU.  Vital signs assessed, head to toe assessment performed, no signs of distress noted at this time.  Patient educated on using call light, bed alarm on due to history of falls.   
Positive stress test results relayed to cardiologist, Dr. Mathieu Mueller. Plan for cath lab tomorrow 2/27/24. NPO at midnight. Dr. Mueller wanted to start Lipitor & Aspirin. Patient has an allergy to aspirin, confirmed with patient she breaks out in hives. Notified Dr. Mueller of patients allergy and also that patient takes Meloxicam daily, OK to hold off on aspirin at this time per Dr. Mueller.   
Writer notified by radiology partners of positive stress test.  Primary RN Mary Lou notified.    
Writer responded to consult for \"cultural\" needs; pt is Jewish but did not need anyone contacted. She and one of her daughters shared about pt's medical condition and were calm and optimistic. Writer said a blessing; they were appreciative of visit.    02/26/24 1802   Encounter Summary   Encounter Overview/Reason  Spiritual/Emotional Needs   Service Provided For: Patient and family together   Referral/Consult From: Nurse   Support System Children   Last Encounter  02/26/24   Complexity of Encounter Moderate   Spiritual/Emotional needs   Type Spiritual Support   Assessment/Intervention/Outcome   Assessment Calm   Intervention Active listening;Discussed illness injury and it’s impact;Explored/Affirmed feelings, thoughts, concerns;Explored Coping Skills/Resources;Prayer (assurance of)/Balsam;Sustaining Presence/Ministry of presence   Outcome Comfort;Engaged in conversation;Expressed feelings, needs, and concerns;Expressed Gratitude;Receptive       
    Subjective   Pain: 4/10 R arm apin  General  Patient assessed for rehabilitation services?: Yes  Additional Pertinent Hx: The patient is a 78 y.o. female who [facility] to [facility] with right arm pain after a slip and fall at home.      Chest XR done in  showed pulmonary venous congestion and cardiomegaly as well as perihilar pulmonary consolidation. She told the  physician she had an episode of chest tightness and dyspnea last week that resolved spontaneously.   R shoulder disclocation, reduced in ER>  Referral Date : 02/26/24  Diagnosis: CHF, R shoulder dislocation s/p fall.  Follows Commands: Within Functional Limits         Social/Functional History  Social/Functional History  Lives With: Family  Type of Home: House  Home Layout: One level, Laundry in basement  Home Access: Stairs to enter with rails, Stairs to enter without rails  Entrance Stairs - Number of Steps: 2  Bathroom Shower/Tub: Walk-in shower, Doors  Bathroom Toilet: Standard  Bathroom Equipment:  (Sink next to the toilet)  Bathroom Accessibility: Accessible  Home Equipment: None  Has the patient had two or more falls in the past year or any fall with injury in the past year?: No  ADL Assistance: Independent  Homemaking Assistance: Independent  Ambulation Assistance: Independent  Transfer Assistance: Independent  Active : Yes  Mode of Transportation: Car  Occupation: Retired  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  Vision/Hearing  Vision  Vision: Impaired  Vision Exceptions: Wears glasses at all times  Hearing  Hearing: Within functional limits    Cognition   Orientation  Overall Orientation Status: Within Normal Limits     Objective   Pulse: 62  Heart Rate Source: Monitor  BP: (!) 144/79  BP Method: Automatic  Patient Position: Semi 
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      Objective  Orientation  Overall Orientation Status: Within Normal Limits      Sensation  Overall Sensation Status: WNL    ADL  Feeding: Setup  Grooming: Setup  UE Bathing: Minimal assistance  LE Bathing: Minimal assistance  UE Dressing: Moderate assistance  LE Dressing: Minimal assistance  LE Dressing Skilled Clinical Factors: Pt able to doff utilizing compensatory techniques. OTR educated on one-handed techniques for donning. Pt required assistance to initiate threading of socks, pt able to pull up over heels.  Toileting: Stand by assistance  Additional Comments: ADL scores based on skilled observation and clinical reasoning unless otherwise noted.         UE Function  LUE AROM (degrees)  LUE AROM : WNL  Left Hand AROM (degrees)  Left Hand AROM: WNL  Tone LUE  LUE Tone: Normotonic  LUE Strength  Gross LUE Strength: WFL  LUE Strength Comment: 4+/5 grossly    RUE AROM (degrees)  RUE AROM : Exceptions  RUE General AROM: Not tested; R UE to remain in sling at all times  Tone RUE  RUE Tone: Not tested (R UE in sling)  RUE Strength  Gross RUE Strength: Exceptions to WFL  R Hand General: NT  RUE Strength Comment: NT secondary to R UE in sling    Hand Dominance  Hand Dominance: Right    Fine Motor Skills/Coordination  Coordination  Movements Are Fluid And Coordinated: Yes  Coordination and Movement 
High risk (>3% annual death or MI) 1. Severe resting LV dysfunction (LVEF <35%) not readily explained by non coronary causes 2. Resting perfusion abnormalities greater than 10% of the myocardium in patients without prior history or evidence of MI 3. Stress-induced perfusion abnormalities encumbering greater than or equal to 10% myocardium or stress segmental scores indicating multiple vascular territories with abnormalities 4. Stress-induced LV dilatation (TID ratio greater than 1.19 for exercise and greater than 1.39 for regadenoson) Intermediate risk (1% to 3% annual death or MI) 1. Mild/moderate resting LV dysfunction (LVEF 35% to 49%) not readily explained by non coronary causes. 2. Resting perfusion abnormalities in 5%-9.9% of the myocardium in patients without a history or prior evidence of MI 3. Stress-induced perfusion abnormality encumbering 5%-9.9% of the myocardium or stress segmental scores indicating 1 vascular territory with abnormalities but without LV dilation 4. Small wall motion abnormality involving 1-2 segments and only 1 coronary bed. Low Risk (Less than 1% annual death or MI) 1. Normal or small myocardial perfusion defect at rest or with stress encumbering less than 5% of the myocardium.     [Large reversible perfusion defect compatible with stress-induced ischemia in the inferior wall with some encroachment into the inferior septum. Normal LVEF.] Risk stratification: [High risk.]  The findings were sent to the Radiology Results Communication Center at [3:44 pm] on [2/26/2024] to be communicated to a licensed caregiver.  Author: Harshad Kiran MD      XR LUMBAR SPINE (2-3 VIEWS)    Result Date: 2/25/2024  EXAMINATION: 2  XRAY VIEWS OF THE LUMBAR SPINE 2/25/2024 10:49 pm COMPARISON: None. HISTORY: ORDERING SYSTEM PROVIDED HISTORY: back pain, fall TECHNOLOGIST PROVIDED HISTORY: back pain, fall Reason for Exam: BACK PAIN, FALL - BACK PAIN POST FALL TONIGHT Additional signs and symptoms: BACK PAIN,

## 2024-02-27 NOTE — DISCHARGE SUMMARY
Family Medicine Discharge Summary    Aster Edward  :  1945  MRN:  [id number]    Admit date:  2024  Discharge date:  2024    Admitting Physician:  Kari Gibbons MD    Discharge Diagnoses:    Patient Active Problem List   Diagnosis    Hypertensive heart disease without heart failure    Other urticaria    Acute gouty arthropathy    Vitamin D deficiency    Body mass index 36.0-36.9, adult    Abdominal mass, RLQ (right lower quadrant)    Chest pain    Bilateral chronic knee pain    Osteoarthritis of both knees    Morbidly obese (HCC)    Female cystocele    Hyperopia of both eyes with astigmatism and presbyopia    Nuclear sclerotic cataract of both eyes    Pulmonary vascular congestion    Closed traumatic anterior dislocation of right glenohumeral joint    Abnormal stress test       Admission Condition:  fair  Discharged Condition:  fair    Hospital Course:   77 yo presented to  with right shoulder pain after a fall. Shoulder was dislocated; reduction was successfully done in . However, imaging showed cardiomegaly and pulmonary edema. Stress test showed a significant reversible defect suggestive of ischemia. Cardiology was consulted, and she was transferred to [facility] for cardiac catheterization and further management.     Discharge Medications:         Medication List        ASK your doctor about these medications      CALTRATE 600+D PO     meloxicam 15 MG tablet  Commonly known as: MOBIC  Take 1 tablet by mouth daily              Consults:  cardiology    Significant Diagnostic Studies:  stress, labs, radiology    Treatments:   supportive therapies    Disposition:   transfer to tertiary center      Signed:  Kari Gibbons MD, FAAFP  2024, 6:09 PM

## 2024-02-27 NOTE — CARE COORDINATION
ONGOING DISCHARGE PLAN:    Patient is alert and oriented x4.    Spoke with patient regarding discharge plan and patient confirms that plan is still to return home with spouse when medically ready.    Patient suffered fall at home where she dislocated her shoulder, reduced in the ER, sling in place.    Cardiology consulted-stress test completed, high risk. Plan for cardiac catheterization today, NPO.     Will continue to follow for additional discharge needs.    If patient is discharged prior to next notation, then this note serves as note for discharge by case management.    Electronically signed by Jo Reddy RN on 2/27/2024 at 10:44 AM    
housing: none  Potential Assistance needed at discharge: N/A            Potential DME:    Patient expects to discharge to: House  Plan for transportation at discharge: Family    Financial    Payor: [facility] MEDICARE / Plan: [facility] MEDICARE COMPLETE / Product Type: *No Product type* /     Does insurance require precert for SNF: Yes    Potential assistance Purchasing Medications: No  Meds-to-Beds request:        [id number] - [place], [place] - [address] - P [phone] - F [phone]  [address]  [place] [address]  Phone: [phone] Fax: [phone]    [facility] - [place], [place] - [address] - P [phone] - F [phone]  [address] [address]  Phone: [phone] Fax: [phone]    [facility] - [place], [place] - [address] -  [phone] - F [phone]  [address]  [facility] [address]  Phone: [phone] Fax: [phone]      Notes:    Factors facilitating achievement of predicted outcomes: Family support, Motivated, Cooperative, Pleasant, and Sense of humor    Barriers to discharge: Upper extremity weakness and Medical complications    Additional Case Management Notes: 2/26/24 [facility] MEDICARE From home with spouse , independent and drives DME : none VNS;none Pt fell at home and dislocated shoulder reduced in ER, wearing sling. Stress test today, cardio consult.Following for needs//JF              The Plan for Transition of Care is related to the following treatment goals of Acute on chronic systolic congestive heart failure (HCC) [I50.23]  Pulmonary vascular congestion [R09.89]  Traumatic closed displaced fracture of right shoulder with anterior dislocation, initial encounter [S42.91XA]    IF APPLICABLE: The Patient and/or patient representative Aster and her family were provided with a choice of provider and agrees with the discharge plan. Freedom of choice list with basic dialogue that

## 2024-02-27 NOTE — DISCHARGE INSTRUCTIONS
Discharge Instructions for angiogram  Home Care     Ok to shower in AM. Discontinue band aid in AM.   Do not apply further band aids. Keep clean, dry and open to air. No powder or lotion.  Do not soak in a pool or tub and do not swim for one week.   If there is any bleeding at the catheter site, apply firm pressure with your hands until the bleeding stops. If bleeding continues after 3 minutes call 911.   If there is any swelling or firm areas at your puncture site, this could be bleeding under the skin(hematoma), and if you have any concerns seek help immediately.        Drink plenty of fluids after the test. This will flush the x-ray dye from your system.   Return to your normal diet.       The sedative will make you sleepy. Rest until the effects have worn off.   Ask your doctor when you will be able to return to work.   Avoid heavy lifting objects greater than 10  pounds, physically demanding activities, and sexual activity for 5-7 days.   Your activity will also depend upon where the catheter was inserted: Do not sit for long periods of time. Try to change positions frequently.   Medications   If advised by your doctor, resume taking your normal medicines. Use acetaminophen (Tylenol) for pain relief.   Do not take metformin (Glucophage) or glyburide and metformin (Glucovance) for 48 hours after the test.    If you had to stop taking these medications before the procedure, ask your doctor when you can resume taking them:   If youAnti-inflammatory drugs (eg, ibuprofen )   Blood thinners, such as warfarin (Coumadin)   Clopidogrel (Plavix)   If you are taking medicines, follow these general guidelines:   Take your medicine as directed. Do not change the amount or the schedule.   Do not stop taking them without talking to your doctor.   Do not share them.   Know what the results and side effects. Report them to your doctor.   Some drugs can be dangerous when mixed. Talk to a doctor or pharmacist if you are taking

## 2024-02-27 NOTE — H&P
[facility]  Pre- Procedure History and Physical/Update          Patient Name:  Aster Edward  MRN:    [id number]  YOB: 1945  Date of evaluation:  2/27/2024    Procedure:                           Cardiac Cath +/- PCI        Indication for procedure:     Abnormal stress test     Please refer to the consult note / H&P completed by Dr. Mueller on 2/26/24 in the medical record and note that:       [x] I have examined the patient and reviewed the H&P/Consult and there are no changes to be made to the assessment or plan.    [] I have examined the patient and reviewed the H&P/Consult and have noted the following changes:        Past Medical History:   Diagnosis Date    Abnormal cardiovascular stress test 02/26/2024    Acute gouty arthropathy     Arthritis     Body mass index 36.0-36.9, adult     CAD (coronary artery disease)     Fall 02/25/2024    Traumatic closed displaced fracture of right shoulder with anterior dislocation    Fracture, shoulder 02/25/2024    RIGHT  /  Traumatic closed displaced fracture of right shoulder with anterior dislocation    Hypertension     hx of    Other abnormal glucose     Other specified urticaria     Unspecified asthma(493.90)     Unspecified hypertensive heart disease without heart failure     Unspecified vitamin D deficiency     Wears glasses        Past Surgical History:   Procedure Laterality Date    BUNIONECTOMY Right     CARDIAC CATHETERIZATION      collagen sponge femoral placed and absorbed    CARDIAC CATHETERIZATION  02/27/2024    DR MUELLER    HYSTERECTOMY (CERVIX STATUS UNKNOWN)  10/02/2018    SHOULDER ARTHROSCOPY Right 07/14/2016    Acromioplasty, SAD, Mini-open rotator cuff repair.    SHOULDER ARTHROSCOPY Right 07/14/2016    TUMOR EXCISION      fatty tumor left mid back        reports that she has never smoked. She has never used smokeless tobacco. She reports that she does not drink alcohol and does not use drugs.    Prior to Admission medications

## 2024-02-29 ENCOUNTER — APPOINTMENT (OUTPATIENT)
Dept: PHYSICAL THERAPY | Age: 79
End: 2024-02-29
Payer: MEDICARE

## 2024-03-05 ENCOUNTER — HOSPITAL ENCOUNTER (OUTPATIENT)
Dept: PHYSICAL THERAPY | Age: 79
Setting detail: THERAPIES SERIES
Discharge: HOME | End: 2024-03-05
Payer: MEDICARE

## 2024-03-05 ENCOUNTER — APPOINTMENT (OUTPATIENT)
Dept: PHYSICAL THERAPY | Age: 79
End: 2024-03-05
Payer: MEDICARE

## 2024-03-05 PROCEDURE — 97110 THERAPEUTIC EXERCISES: CPT

## 2024-03-05 NOTE — FLOWSHEET NOTE
[facility]   [address]   Phone: [phone]   Fax: [phone]    Physical Therapy Daily Treatment Note      Date:  3/5/2024  Patient Name:  Aster Edward    :  1945  MRN: [id number]  Physician: Harry Mcclure MD              Insurance: [facility] Medicare  TRACK CAP  Diagnosis:   M54.59 (ICD-10-CM) - Other low back pain   M17.0 (ICD-10-CM) - Bilateral primary osteoarthritis of knee      Onset Date: chronic   Next DrChela Appt: none at this time  Visit# / total visits:   Cancels/No Shows: 0/1    Precautions: fall risk; RUE limitations- from fall    Subjective:    Patient had a fall on the  and fel on the RUE.  Patient reporting she had her heart looked at and there were no significant findings that required excessive attention.    Pain:  [x] Yes  [] No Location: Back and L hip. Pain Rating: (0-10 scale) 5/10  Pain altered Tx:  [] No  [] Yes  Action:  Comments:    Objective:  INTERVENTIONS  INTERVENTIONS  Reps/ Time Weight/ Level Completed  Today Comments          MODALITIES                      MANUAL        Long [place] L hip Distraction 3 min   Added  performed GIOVANNI this session per pt's request          EXERCISES        Mat Level:       Hamstring stretch 3x 30\"  x  Therapist assist   SKTC 3x30\"  x Therapist assist   Abdominal Bracing  12 x 5\"   Increased hold time 2/20   Marching with Abdominal Bracing  10 x 2 red x 3/5 inc resistance  Emphasis on slow   BKFO 10 x 2 red x 3/5 inc resistance   Clam shells GIOVANNI 10 x yellow  Added resistance 2/20   Piriformis Stretch GIOVANNI 3 x 30\"      LTR 10x 5\"  x 3/5 added   Seated:       LAQ GIOVANNI 10 x2  3\" 1#  Added 2/20   Knee Flexion GIOVANNI 10 x 2 yellow  /22 inc sets   Marches GIOVANNI 10 x 2      Ball Squeezes 10 x 3\"             Standing:       Pull Downs 10 x 2 yellow  Increased sets 2/20   Rows 10 x 2 yellow  Increased sets 2/20   Paloff Press 10 x yellow     Hip 3 Way 10 x  x Added

## 2024-03-06 ENCOUNTER — HOSPITAL ENCOUNTER (OUTPATIENT)
Dept: PHYSICAL THERAPY | Age: 79
Setting detail: THERAPIES SERIES
Discharge: HOME | End: 2024-03-06
Payer: MEDICARE

## 2024-03-06 PROCEDURE — 97110 THERAPEUTIC EXERCISES: CPT

## 2024-03-06 NOTE — FLOWSHEET NOTE
[facility]   [address]   Phone: [phone]   Fax: [phone]    Physical Therapy Daily Treatment Note      Date:  3/6/2024  Patient Name:  Aster Edward    :  1945  MRN: [id number]  Physician: Harry Mcclure MD              Insurance: [facility] Medicare  TRACK CAP  Diagnosis:   M54.59 (ICD-10-CM) - Other low back pain   M17.0 (ICD-10-CM) - Bilateral primary osteoarthritis of knee      Onset Date: chronic   Next DrChela Appt: none at this time  Visit# / total visits:   Cancels/No Shows: 0/1    Precautions: fall risk; RUE limitations- from fall    Subjective:    Patient reporting to therapy stating she did not sleep well last night due to shoulder pain.  Patient continues to report pain at night and when standing or walking for extended amounts of time.      Pain:  [x] Yes  [] No   Low back pain denies/10  Shoulder R 3-4/10  Pain altered Tx:  [] No  [] Yes  Action:  Comments:    Objective:  INTERVENTIONS  INTERVENTIONS  Reps/ Time Weight/ Level Completed  Today Comments          MODALITIES                      MANUAL        Long [place] L hip Distraction 3 min   Added  performed GIOVANNI this session per pt's request          EXERCISES        Mat Level:       Hamstring stretch 3x 30\"  x  Therapist assist   SKTC 3x30\"  x Therapist assist   Abdominal Bracing  12 x 5\"   Increased hold time 20   Marching with Abdominal Bracing  10 x 2 red x 3/5 inc resistance  3/6 continued Emphasis on slow   BKFO 10 x 2 red x 3/5 inc resistance   Clam shells GIOVANNI 10 x yellow  Added resistance 2/20   Piriformis Stretch GIOVANNI 3 x 30\"  x    LTR 10x 5\"  x 3/5 added   Bridging  10x2 3\"  x 3/6 added   Seated:       LAQ GIOVANNI 10 x2  3\" 1#  Added 2/20   Knee Flexion GIOVANNI 10 x 2 yellow   inc sets   Marches GIOVANNI 10 x 2      Ball Squeezes 10 x 3\"             Standing:       Pull Downs 10 x 2 yellow  Increased sets 2/20   Rows 10 x 2 yellow  Increased sets /20   Paloff

## 2024-03-07 ENCOUNTER — APPOINTMENT (OUTPATIENT)
Dept: GENERAL RADIOLOGY | Age: 79
End: 2024-03-07
Payer: MEDICARE

## 2024-03-07 ENCOUNTER — APPOINTMENT (OUTPATIENT)
Dept: PHYSICAL THERAPY | Age: 79
End: 2024-03-07
Payer: MEDICARE

## 2024-03-07 ENCOUNTER — HOSPITAL ENCOUNTER (EMERGENCY)
Age: 79
Discharge: HOME | End: 2024-03-08
Attending: EMERGENCY MEDICINE
Payer: MEDICARE

## 2024-03-07 DIAGNOSIS — S43.014D ANTERIOR DISLOCATION OF RIGHT SHOULDER, SUBSEQUENT ENCOUNTER: Primary | ICD-10-CM

## 2024-03-07 PROCEDURE — 99285 EMERGENCY DEPT VISIT HI MDM: CPT

## 2024-03-07 PROCEDURE — 96374 THER/PROPH/DIAG INJ IV PUSH: CPT

## 2024-03-07 PROCEDURE — 96375 TX/PRO/DX INJ NEW DRUG ADDON: CPT

## 2024-03-07 PROCEDURE — 73020 X-RAY EXAM OF SHOULDER: CPT

## 2024-03-07 PROCEDURE — 6360000002 HC RX W HCPCS

## 2024-03-07 PROCEDURE — 73030 X-RAY EXAM OF SHOULDER: CPT

## 2024-03-07 PROCEDURE — 23650 CLTX SHO DSLC W/MNPJ WO ANES: CPT

## 2024-03-07 RX ORDER — ACETAMINOPHEN 325 MG/1
650 TABLET ORAL EVERY 6 HOURS PRN
Qty: 120 TABLET | Refills: 0 | Status: SHIPPED | OUTPATIENT
Start: 2024-03-07

## 2024-03-07 RX ORDER — FENTANYL CITRATE 0.05 MG/ML
25 INJECTION, SOLUTION INTRAMUSCULAR; INTRAVENOUS ONCE
Status: COMPLETED | OUTPATIENT
Start: 2024-03-07 | End: 2024-03-07

## 2024-03-07 RX ADMIN — FENTANYL CITRATE 25 MCG: 0.05 INJECTION, SOLUTION INTRAMUSCULAR; INTRAVENOUS at 22:31

## 2024-03-07 RX ADMIN — PROPOFOL 40.8 MG: 10 INJECTION, EMULSION INTRAVENOUS at 23:23

## 2024-03-07 RX ADMIN — PROPOFOL 20 MG: 10 INJECTION, EMULSION INTRAVENOUS at 23:26

## 2024-03-07 ASSESSMENT — PAIN SCALES - GENERAL: PAINLEVEL_OUTOF10: 10

## 2024-03-08 ENCOUNTER — OFFICE VISIT (OUTPATIENT)
Dept: ORTHOPEDIC SURGERY | Age: 79
End: 2024-03-08
Payer: MEDICARE

## 2024-03-08 VITALS
TEMPERATURE: 97 F | OXYGEN SATURATION: 96 % | BODY MASS INDEX: 33.13 KG/M2 | SYSTOLIC BLOOD PRESSURE: 134 MMHG | HEART RATE: 57 BPM | WEIGHT: 180 LBS | HEIGHT: 62 IN | DIASTOLIC BLOOD PRESSURE: 56 MMHG | RESPIRATION RATE: 23 BRPM

## 2024-03-08 VITALS — BODY MASS INDEX: 33.13 KG/M2 | WEIGHT: 180 LBS | HEIGHT: 62 IN | RESPIRATION RATE: 14 BRPM

## 2024-03-08 DIAGNOSIS — M25.511 RIGHT SHOULDER PAIN, UNSPECIFIED CHRONICITY: ICD-10-CM

## 2024-03-08 DIAGNOSIS — S43.016A ANTERIOR SHOULDER DISLOCATION, INITIAL ENCOUNTER: Primary | ICD-10-CM

## 2024-03-08 PROCEDURE — 1123F ACP DISCUSS/DSCN MKR DOCD: CPT

## 2024-03-08 PROCEDURE — G8484 FLU IMMUNIZE NO ADMIN: HCPCS

## 2024-03-08 PROCEDURE — 1036F TOBACCO NON-USER: CPT

## 2024-03-08 PROCEDURE — G8417 CALC BMI ABV UP PARAM F/U: HCPCS

## 2024-03-08 PROCEDURE — 1090F PRES/ABSN URINE INCON ASSESS: CPT

## 2024-03-08 PROCEDURE — G8399 PT W/DXA RESULTS DOCUMENT: HCPCS

## 2024-03-08 PROCEDURE — G8427 DOCREV CUR MEDS BY ELIG CLIN: HCPCS

## 2024-03-08 PROCEDURE — 99203 OFFICE O/P NEW LOW 30 MIN: CPT

## 2024-03-08 RX ORDER — OXYCODONE HYDROCHLORIDE 5 MG/1
5 TABLET ORAL EVERY 8 HOURS PRN
Qty: 3 TABLET | Refills: 0 | Status: SHIPPED | OUTPATIENT
Start: 2024-03-07 | End: 2024-03-08

## 2024-03-08 NOTE — ED TRIAGE NOTES
Pt arrives via [facility], pt was seen here aprox 10 days ago for right shoulder dislocation. Pt fell tonight, having right shoulder pain. Pt has arm in a sling. Pt took 1g of tylenol and 10mg of baclofen PTA.

## 2024-03-08 NOTE — PROGRESS NOTES
Medications   Medication Sig Dispense Refill    oxyCODONE (ROXICODONE) 5 MG immediate release tablet Take 1 tablet by mouth every 8 hours as needed for Pain for up to 3 doses. Intended supply: 3 days. Take lowest dose possible to manage pain Max Daily Amount: 15 mg 3 tablet 0    acetaminophen (TYLENOL) 325 MG tablet Take 2 tablets by mouth every 6 hours as needed for Pain 120 tablet 0    baclofen (LIORESAL) 10 MG tablet Take 1 tablet by mouth 3 times daily as needed (muscle spasms) 90 tablet 0    atorvastatin (LIPITOR) 10 MG tablet Take 1 tablet by mouth daily 30 tablet 3    meloxicam (MOBIC) 15 MG tablet Take 1 tablet by mouth daily 90 tablet 3    Calcium Carbonate-Vitamin D (CALTRATE 600+D PO) Take 1 tablet by mouth 2 times daily       Current Facility-Administered Medications   Medication Dose Route Frequency Provider Last Rate Last Admin    lidocaine 1 % injection 20 mL  20 mL IntraDERmal Once Kari Gibbons MD        methylPREDNISolone acetate (DEPO-MEDROL) injection 40 mg  40 mg IntraMUSCular Once Kari Gibbons MD        bupivacaine (MARCAINE) 0.25 % injection 7.5 mg  3 mL Intra-artICUlar Once Kari Gibbons MD           Allergies  Allergies have been reviewed.  Aster is allergic to aspirin, norvasc [amlodipine besylate], penicillins, and vioxx [rofecoxib].    Social History  Aster  reports that she has never smoked. She has never used smokeless tobacco. She reports that she does not drink alcohol and does not use drugs.    Family History  Aster's family history includes Breast Cancer (age of onset: 80) in her mother; Cancer in her father and mother.    Review of Systems   History obtained from the patient.   REVIEW OF SYSTEMS:   Constitution: negative for fever, chills, weight loss or malaise   Musculoskeletal: As noted in the HPI   Neurologic: As noted in the HPI    Physical Exam  Resp 14   Ht 1.575 m (5' 2.01\")   Wt 81.6 kg (180 lb)   BMI 32.91 kg/m²    General Appearance: alert,

## 2024-03-08 NOTE — ED NOTES
Discharge instructions reviewed with patient and patient's daughter,noting all directions and education by provider. Patient and patient's daughter  verbalize understanding of all information reviewed, gathered personal items, and transferred via wheelchair to car without incident.

## 2024-03-08 NOTE — PROGRESS NOTES
RT present during conscious sedation for shoulder injury. EtCO2 NC applied and pt remained stable during sedation.

## 2024-03-08 NOTE — ED PROVIDER NOTES
[facility] ED  Emergency Department Encounter  Emergency Medicine Resident     Pt Name:Aster Edward  MRN: [id number]  Birthdate 1945  Date of evaluation: 3/7/24  PCP:  Kari Gibbons MD  Note Started: 10:08 PM EST      CHIEF COMPLAINT       Chief Complaint   Patient presents with    Shoulder Injury       HISTORY OF PRESENT ILLNESS  (Location/Symptom, Timing/Onset, Context/Setting, Quality, Duration, Modifying Factors, Severity.)      Aster Edward is a 78 y.o. female who presents with concern for right shoulder dislocation.  Patient dislocated her right shoulder on 2/25 after a fall and it was reduced in the ER.  Patient was lifting her right arm this evening above her head when she felt a pop.  She is complaining of pain in her right shoulder.  Denies any numbness.  Patient has intermittently been wearing her right arm sling.    PAST MEDICAL / SURGICAL / SOCIAL / FAMILY HISTORY      has a past medical history of Abnormal cardiovascular stress test, Acute gouty arthropathy, Arthritis, Body mass index 36.0-36.9, adult, CAD (coronary artery disease), Fall, Fracture, shoulder, Hypertension, Other abnormal glucose, Other specified urticaria, Unspecified asthma(493.90), Unspecified hypertensive heart disease without heart failure, Unspecified vitamin D deficiency, and Wears glasses.     has a past surgical history that includes Cardiac catheterization; Bunionectomy (Right); tumor excision; Shoulder arthroscopy (Right, 07/14/2016); Shoulder arthroscopy (Right, 07/14/2016); Hysterectomy (10/02/2018); Cardiac catheterization (02/27/2024); and Cardiac procedure (N/A, 2/27/2024).      Social History     Socioeconomic History    Marital status:      Spouse name: Not on file    Number of children: Not on file    Years of education: Not on file    Highest education level: Not on file   Occupational History    Not on file   Tobacco Use    Smoking status: Never    Smokeless tobacco: Never  tablets by mouth every 6 hours as needed for Pain    OXYCODONE (ROXICODONE) 5 MG IMMEDIATE RELEASE TABLET    Take 1 tablet by mouth every 8 hours as needed for Pain for up to 3 doses. Intended supply: 3 days. Take lowest dose possible to manage pain Max Daily Amount: 15 mg       Edil Garner MD  Emergency Medicine Resident    (Please note that portions of this note were completed with a voice recognition program.  Efforts were made to edit the dictations but occasionally words are mis-transcribed.),p-

## 2024-03-08 NOTE — ED PROVIDER NOTES
SHOULDER 3/7/2024 10:18 pm COMPARISON: February 25, 2024 HISTORY: ORDERING SYSTEM PROVIDED HISTORY: pain TECHNOLOGIST PROVIDED HISTORY: pain Reason for Exam: Pt had recent dislocation, states she lifted her arm up and felt it move again FINDINGS: Three views of the shoulder demonstrate acute anteroinferior dislocation.  No definite fracture.  Mild glenohumeral and acromioclavicular osteoarthritis is seen. No obvious soft tissue injury.     1. Anteroinferior dislocation of the right shoulder.  No discrete fracture.     Cardiac procedure    Result Date: 2/27/2024    Minimal coronary artery disease.   Normal LV systolic function.     Nuclear stress test with myocardial perfusion    Result Date: 2/26/2024    ECG: Resting ECG demonstrates normal sinus rhythm.   ECG: The ECG was negative for ischemia.   Stress Test: A pharmacological stress test was performed using lexiscan.   Stress ECG: There were no arrhythmias during stress. Non-specific ST abnormality noted. The ECG was negative for ischemia. EXAMINATION: MYOCARDIAL PERFUSION IMAGING [2/26/2024 10:17 am] TECHNIQUE: For the rest study, [36.9] mCi of Tc-99m labeled sestamibi were injected.SPECT images with ECG gating were acquired. Under cardiology supervision, 0.4 mg Lexiscan was infused.  After pharmacologic stress, [16.4] mCi of Tc-99m labeled sestamibi were injected. SPECT images were acquired. COMPARISON: [None Available.] HISTORY: [ORDERING SYSTEM PROVIDED HISTORY: CHF,] [] FINDINGS:   [No prone images were acquired.  Large moderate reversible perfusion defect involving the inferior wall from apex to basal segments and extending into the inferior septum.  No prone images to correlate.  Findings would be compatible with stress-induced ischemia.  Extent of defect greater than 10% of myocardium.  [The gated images show no wall motion abnormalities. Normal myocardial thickening.] Perfusion scores are visually adjusted to account for artifact. Summed stress score:   [19] Summed rest score:  [1] Summed difference score: [18] Function: End diastolic volume:  [53]mL Left ventricular ejection fraction:  [76]% TID score:  [0.93] (scores greater than 1.39 are considered elevated for Lexiscan stress with Tc99m) Notes concerning risk stratification:  Risk stratification incorporates both clinical history and some testing results.  Final risk determination is the responsibility of the ordering provider as other patient information and test results may increase or decrease the risk assessment reported for this examination.  Risk stratification criteria are adapted from \"Noninvasive Risk Stratification\" criteria from Pearce et.  Al, ACC/AATS/AHA/ASE/ASNC/SCAI/SCCT/STS 2017 Appropriate Use Criteria For Coronary Revascularization in Patients With Stable Ischemic Heart Disease [facility] Volume 69, Issue 17, May 2017 High risk (>3% annual death or MI) 1. Severe resting LV dysfunction (LVEF <35%) not readily explained by non coronary causes 2. Resting perfusion abnormalities greater than 10% of the myocardium in patients without prior history or evidence of MI 3. Stress-induced perfusion abnormalities encumbering greater than or equal to 10% myocardium or stress segmental scores indicating multiple vascular territories with abnormalities 4. Stress-induced LV dilatation (TID ratio greater than 1.19 for exercise and greater than 1.39 for regadenoson) Intermediate risk (1% to 3% annual death or MI) 1. Mild/moderate resting LV dysfunction (LVEF 35% to 49%) not readily explained by non coronary causes. 2. Resting perfusion abnormalities in 5%-9.9% of the myocardium in patients without a history or prior evidence of MI 3. Stress-induced perfusion abnormality encumbering 5%-9.9% of the myocardium or stress segmental scores indicating 1 vascular territory with abnormalities but without LV dilation 4. Small wall motion abnormality involving 1-2 segments and only 1 coronary bed. Low Risk (Less than 1% annual

## 2024-03-08 NOTE — DISCHARGE INSTRUCTIONS
You were seen in the emergency department for right shoulder pain and concern for dislocation.  Your vital signs were stable.  You were found to have a right shoulder dislocation.  We used sedating medication to help put your shoulder back in place.  X-ray confirmed correct placement.    Please keep your sling on until you are evaluated by orthopedic surgery.  I did send some Tylenol and a couple doses of Roxicodone to the pharmacy.  Please take these as needed for pain.  You may also apply ice to your right shoulder as well.  The Roxicodone may make you drowsy.  Recommend only taking this before bed.    Please call the orthopedic surgeon I have provided tomorrow to schedule an appointment next week for follow-up given recent ER visit for right anterior shoulder dislocation.

## 2024-03-12 ENCOUNTER — APPOINTMENT (OUTPATIENT)
Dept: PHYSICAL THERAPY | Age: 79
End: 2024-03-12
Payer: MEDICARE

## 2024-03-13 ENCOUNTER — APPOINTMENT (OUTPATIENT)
Dept: PHYSICAL THERAPY | Age: 79
End: 2024-03-13
Payer: MEDICARE

## 2024-03-14 ENCOUNTER — APPOINTMENT (OUTPATIENT)
Dept: PHYSICAL THERAPY | Age: 79
End: 2024-03-14
Payer: MEDICARE

## 2024-03-18 ENCOUNTER — APPOINTMENT (OUTPATIENT)
Dept: PHYSICAL THERAPY | Age: 79
End: 2024-03-18
Payer: MEDICARE

## 2024-03-19 ENCOUNTER — APPOINTMENT (OUTPATIENT)
Dept: PHYSICAL THERAPY | Age: 79
End: 2024-03-19
Payer: MEDICARE

## 2024-03-20 ENCOUNTER — APPOINTMENT (OUTPATIENT)
Dept: PHYSICAL THERAPY | Age: 79
End: 2024-03-20
Payer: MEDICARE

## 2024-03-21 ENCOUNTER — APPOINTMENT (OUTPATIENT)
Dept: PHYSICAL THERAPY | Age: 79
End: 2024-03-21
Payer: MEDICARE

## 2024-03-25 ENCOUNTER — HOSPITAL ENCOUNTER (OUTPATIENT)
Dept: PHYSICAL THERAPY | Age: 79
Setting detail: THERAPIES SERIES
End: 2024-03-25
Payer: MEDICARE

## 2024-03-27 ENCOUNTER — APPOINTMENT (OUTPATIENT)
Dept: PHYSICAL THERAPY | Age: 79
End: 2024-03-27
Payer: MEDICARE

## 2024-04-01 ENCOUNTER — OFFICE VISIT (OUTPATIENT)
Dept: ORTHOPEDIC SURGERY | Age: 79
End: 2024-04-01
Payer: MEDICARE

## 2024-04-01 DIAGNOSIS — S43.016A ANTERIOR SHOULDER DISLOCATION, INITIAL ENCOUNTER: Primary | ICD-10-CM

## 2024-04-01 PROCEDURE — G8428 CUR MEDS NOT DOCUMENT: HCPCS | Performed by: ORTHOPAEDIC SURGERY

## 2024-04-01 PROCEDURE — 1090F PRES/ABSN URINE INCON ASSESS: CPT | Performed by: ORTHOPAEDIC SURGERY

## 2024-04-01 PROCEDURE — 1036F TOBACCO NON-USER: CPT | Performed by: ORTHOPAEDIC SURGERY

## 2024-04-01 PROCEDURE — 99212 OFFICE O/P EST SF 10 MIN: CPT | Performed by: ORTHOPAEDIC SURGERY

## 2024-04-01 PROCEDURE — G8417 CALC BMI ABV UP PARAM F/U: HCPCS | Performed by: ORTHOPAEDIC SURGERY

## 2024-04-01 PROCEDURE — 1123F ACP DISCUSS/DSCN MKR DOCD: CPT | Performed by: ORTHOPAEDIC SURGERY

## 2024-04-01 PROCEDURE — G8399 PT W/DXA RESULTS DOCUMENT: HCPCS | Performed by: ORTHOPAEDIC SURGERY

## 2024-04-01 NOTE — PROGRESS NOTES
HPI: Ms. Edward is a 78-year-old lady who has dislocated her right shoulder twice in the past month and a half.  She first dislocated her shoulder on 2/25/2024 after a fall and then again on 3/7/2024 when she tried to put her hand over her head while laying in bed.  She was seen by Moise Garcia PA-C shortly after the second dislocation.  She has remained in her sling.    Physical examination:  Evaluation patient's right shoulder and upper extremity demonstrates intact skin without warmth erythema or notable swelling.  No muscular atrophy noted.  Her active and passive range of motion is limited due to pain.  She is tender to palpation over the anterior aspect of shoulder.  She has 5/5 muscle strength with resisted internal and external rotation with her arm at her side.  I am unable to adequately assess supraspinatus strength due to pain.  Sensation is grossly intact light touch in all dermatomes.    Imaging studies:  4 x-ray views of the right shoulder completed on 4/1/2024 reviewed independently demonstrating normal glenohumeral and acromioclavicular joint spaces.  Mild osteophytic change involving the distal end of the clavicle and acromion at the acromioclavicular joint.  Type II acromion.  No obvious fracture, dislocation or subluxation.    Impression and plan: Ms. Edward is a 78-year-old lady with right shoulder anterior dislocation.  She has had 2 incidents in the span of a month and a half.  I had a discussion with the patient today educating her about this condition and discussed treatment options available to her.  I recommended proceeding conservatively at this time.  She can wean out of her sling.  I did recommend formal physical therapy to begin right away and a prescription was provided.  She may use her arm for light activities of daily living and avoiding combining shoulder abduction and external rotation.  I will see her back for reevaluation in 6 weeks.  I did explain to the patient that she is

## 2024-04-12 ENCOUNTER — HOSPITAL ENCOUNTER (OUTPATIENT)
Dept: PHYSICAL THERAPY | Age: 79
Setting detail: THERAPIES SERIES
Discharge: HOME | End: 2024-04-12
Payer: MEDICARE

## 2024-04-12 PROCEDURE — 97110 THERAPEUTIC EXERCISES: CPT

## 2024-04-12 PROCEDURE — 97161 PT EVAL LOW COMPLEX 20 MIN: CPT

## 2024-04-12 NOTE — CONSULTS
Evaluation       [x]  Low       []  Moderate       []  High 41 1   []  Modalities     [x]  Ther Exercise 10 1   []  Manual Therapy     []  Ther Activities     []  Aquatics     []  Neuromuscular     []  Gait Training     []  Dry Needling           1-2 muscles     []  Dry Needling           3 or more muscles     [] Vasocompression     []  Other       TOTAL TREATMENT TIME: 51  Billed treatment time: 51     Time in: 7:45 am    Time Out: 8:36 am    Electronically signed by:   Chelsey Cr, PT, DPT   #[id number]    Thank you for referring this patient to physical therapy. Please feel free to contact with any questions or concerns with plan of care.      Physician Signature:________________________________Date:__________________  By signing above or cosigning this note, I have reviewed this plan of care and certify a need for medically necessary rehabilitation services.     *PLEASE SIGN ABOVE AND FAX BACK ALL PAGES*

## 2024-04-16 ENCOUNTER — HOSPITAL ENCOUNTER (OUTPATIENT)
Dept: PHYSICAL THERAPY | Age: 79
Setting detail: THERAPIES SERIES
Discharge: HOME | End: 2024-04-16
Payer: MEDICARE

## 2024-04-16 PROCEDURE — 97110 THERAPEUTIC EXERCISES: CPT

## 2024-04-16 PROCEDURE — 97016 VASOPNEUMATIC DEVICE THERAPY: CPT

## 2024-04-16 NOTE — FLOWSHEET NOTE
would continue to benefit from skilled physical therapy services in order to: decrease pain and increase ROM and strength at the RUE to work towards improving functional mobility at PLOF.     GOALS:   STG: (to be met in 10 treatments)  Pt will self report worst pain no greater than 6-7/10 in order to better tolerate ADLs/work activities with minimal dysfunction  Pt will improve AROM in R shoulder flexion 105 in order to demonstrate ability to move/reach in all planes unrestricted at PLOF  Pt will improve AROM in R shoulder ABD to 90 in order to demonstrate ability to move/reach in all planes unrestricted at PLOF  Pt will improve AROM in R shoulder ER to 65 in order to demonstrate ability to move/reach in all planes unrestricted at PLOF  LTG: (to be met in 20 treatments)  Pt will demonstrate improved R UE strength to 4/5 or greater in order to demonstrate improved stability/strength necessary for unrestricted ADLs/work activities  Pt will increase 28 to 48 on UEFI in order to demonstrate improved functional tolerances at PLOF with minimal restriction/dysfunction  Pt will demonstrate independence with a long term HEP for continued progress/maintenance after completion of PT  Pt will improve AROM in R shoulder ABD and flexion to WFL in order to demonstrate ability to move/reach in all planes unrestricted at PLOF  Pt will be able to perform Apley ER at RUE to C3 to be able to wash her hair without difficulty at PLOF to show improved mobility to move/reach  Pt will report little bit to no difficulty on UEFI for cutting / preparing food to show improved functional tolerances at PLOF  Pt will report no falls for x6 weeks demonstrating improved safety with mobility and implementation of fall prevention strategies.       Plan: [x] Continue per plan of care.   [] Other:      Treatment Charges: Mins Units   []  Modalities     [x]  Ther Exercise 38 3   []  Manual Therapy     []  Ther Activities     []  Aquatics     []

## 2024-04-18 ENCOUNTER — HOSPITAL ENCOUNTER (OUTPATIENT)
Dept: PHYSICAL THERAPY | Age: 79
Setting detail: THERAPIES SERIES
Discharge: HOME | End: 2024-04-18
Payer: MEDICARE

## 2024-04-18 PROCEDURE — 97110 THERAPEUTIC EXERCISES: CPT

## 2024-04-18 NOTE — FLOWSHEET NOTE
[facility]   [address]   Phone: [phone]   Fax: [phone]    Physical Therapy Daily Treatment Note      Date:  2024  Patient Name:  Aster Edward    :  1945  MRN: [id number]  Physician: Melissa Fisher MD     Insurance: [facility] Medicare with visits BMN (4 modalities per day )   Diagnosis:  S43.016A (ICD-10-CM) - Anterior shoulder dislocation, initial encounter         Onset Date: 2024 first dislocation , 3/7/2024 second dislocation      Next  Appt: 2024   Visit# / total visits: 3/20  Cancels/No Shows: 0/0    Precautions: see below, following anterior shoulder dislocation guidelines (hard copy in pt's file folder), fall risk  Per EPIC 2024: \"She may use her arm for light activities of daily living and avoiding combining shoulder abduction and external rotation.\" \"She can wean out of her sling.\"      Per EPIC: Work on R shoulder following guidelines for anterior shoulder dislocation. Guidelines provided to pt. Eval and treat. Modalities as needed. Teach home exercise program.   2-3 times a week for 4-6 weeks.    --CURRENTLY FOLLOWING PHASE I OF DISLOCATION GUIDELINES    Subjective:  Pt reporting to therapy reporting some pain in the shoulder.  The patient reports she cannot wash her hair and continues to have difficulty lifting her arm up occasionally.    Pain:  [x] Yes  [] No Location:  N/A Pain Rating: (0-10 scale) 0/10  Pain altered Tx:  [x] No  [] Yes  Action:  Comments:     Objective:  INTERVENTIONS  INTERVENTIONS  Reps/ Time Weight/ Level Completed  Today Comments          MODALITIES        Vasocompression R Shoulder 10 min   40 degrees , low compression          MANUAL                      EXERCISES        Standing:       Pulley Flex / ABD 10 x eac  x    Finger Wall Flex / ABD 10 x 3\" Flex  5 x 3\" ABD  x Flex 16   Rows 10 x 2 red x 4/18 progressed TB   RUE IR / ER walk out  10 x eac yellow x 4/18 isometrics

## 2024-04-23 ENCOUNTER — HOSPITAL ENCOUNTER (OUTPATIENT)
Dept: PHYSICAL THERAPY | Age: 79
Setting detail: THERAPIES SERIES
Discharge: HOME | End: 2024-04-23
Payer: MEDICARE

## 2024-04-23 PROCEDURE — 97110 THERAPEUTIC EXERCISES: CPT

## 2024-04-23 NOTE — FLOWSHEET NOTE
[facility]   [address]   Phone: [phone]   Fax: [phone]    Physical Therapy Daily Treatment Note      Date:  2024  Patient Name:  Aster Edward    :  1945  MRN: [id number]  Physician: Melissa Fisher MD     Insurance: [facility] Medicare with visits BMN (4 modalities per day )   Diagnosis:  S43.016A (ICD-10-CM) - Anterior shoulder dislocation, initial encounter         Onset Date: 2024 first dislocation , 3/7/2024 second dislocation      Next  Appt: 2024   Visit# / total visits:   Cancels/No Shows: 0/0    Precautions: see below, following anterior shoulder dislocation guidelines (hard copy in pt's file folder), fall risk  Per EPIC 2024: \"She may use her arm for light activities of daily living and avoiding combining shoulder abduction and external rotation.\" \"She can wean out of her sling.\"      Per EPIC: Work on R shoulder following guidelines for anterior shoulder dislocation. Guidelines provided to pt. Eval and treat. Modalities as needed. Teach home exercise program.   2-3 times a week for 4-6 weeks.    --CURRENTLY FOLLOWING PHASE I OF DISLOCATION GUIDELINES    Subjective:  Pt comes in reporting no pain an feels good. Did feel sore yesterday but reports she was doing the dishes and cooked, and understands that she probably over did it with her arm.    Pain:  [] Yes  [x] No Location:  N/A Pain Rating: (0-10 scale) 0/10  Pain altered Tx:  [x] No  [] Yes  Action:  Comments:     Objective:  INTERVENTIONS  INTERVENTIONS  Reps/ Time Weight/ Level Completed  Today Comments          MODALITIES        Vasocompression R Shoulder 10 min   40 degrees , low compression          MANUAL                      EXERCISES        Standing:       Pulley Flex / ABD 10 x eac  x    Finger Wall Flex / ABD 10 x 3\" Flex  5 x 3\" ABD  x Flex 16  ABD 15   Rows 10 x 2 red x  progressed TB   RUE IR / ER walk out  10 x eac yellow x

## 2024-04-25 ENCOUNTER — HOSPITAL ENCOUNTER (OUTPATIENT)
Dept: PHYSICAL THERAPY | Age: 79
Setting detail: THERAPIES SERIES
Discharge: HOME | End: 2024-04-25
Payer: MEDICARE

## 2024-04-25 PROCEDURE — 97110 THERAPEUTIC EXERCISES: CPT

## 2024-04-25 NOTE — FLOWSHEET NOTE
[facility]   [address]   Phone: [phone]   Fax: [phone]    Physical Therapy Daily Treatment Note      Date:  2024  Patient Name:  Aster Edward    :  1945  MRN: [id number]  Physician: Melissa Fisher MD     Insurance: [facility] Medicare with visits BMN (4 modalities per day )   Diagnosis:  S43.016A (ICD-10-CM) - Anterior shoulder dislocation, initial encounter         Onset Date: 2024 first dislocation , 3/7/2024 second dislocation      Next  Appt: 2024   Visit# / total visits:   Cancels/No Shows: 0/0    Precautions: see below, following anterior shoulder dislocation guidelines (hard copy in pt's file folder), fall risk  Per EPIC 2024: \"She may use her arm for light activities of daily living and avoiding combining shoulder abduction and external rotation.\" \"She can wean out of her sling.\"      Per EPIC: Work on R shoulder following guidelines for anterior shoulder dislocation. Guidelines provided to pt. Eval and treat. Modalities as needed. Teach home exercise program.   2-3 times a week for 4-6 weeks.    --CURRENTLY FOLLOWING PHASE I OF DISLOCATION GUIDELINES    Subjective:  Pt comes in with no pain at this date, reporting that she was sore after last session.    Pain:  [] Yes  [x] No Location:  N/A Pain Rating: (0-10 scale) 0/10  Pain altered Tx:  [x] No  [] Yes  Action:  Comments:     Objective:  INTERVENTIONS  INTERVENTIONS  Reps/ Time Weight/ Level Completed  Today Comments          MODALITIES        Vasocompression R Shoulder 10 min   40 degrees , low compression          MANUAL                      EXERCISES        Standing:       Pulley Flex / ABD 10 x eac  x    Finger Wall Flex / ABD 10 x 3\" Flex  5 x 3\" ABD  x Flex 16  ABD 15   Rows 12 x 2 red x  progressed TB  Increase reps    RUE IR / ER walk out  10 x eac yellow x  isometrics          Seated:       Scapular Retraction 10 x 3\"      Shoulder

## 2024-04-30 ENCOUNTER — HOSPITAL ENCOUNTER (OUTPATIENT)
Dept: PHYSICAL THERAPY | Age: 79
Setting detail: THERAPIES SERIES
Discharge: HOME | End: 2024-04-30
Payer: MEDICARE

## 2024-04-30 PROCEDURE — 97110 THERAPEUTIC EXERCISES: CPT

## 2024-04-30 NOTE — FLOWSHEET NOTE
after completion of PT  Pt will improve AROM in R shoulder ABD and flexion to WFL in order to demonstrate ability to move/reach in all planes unrestricted at PLOF  Pt will be able to perform Apley ER at RUE to C3 to be able to wash her hair without difficulty at PLOF to show improved mobility to move/reach  Pt will report little bit to no difficulty on UEFI for cutting / preparing food to show improved functional tolerances at PLOF  Pt will report no falls for x6 weeks demonstrating improved safety with mobility and implementation of fall prevention strategies.       Plan: [x] Continue per plan of care.   [] Other:      Treatment Charges: Mins Units   []  Modalities     [x]  Ther Exercise 43 3   []  Manual Therapy     []  Ther Activities     []  Aquatics     []  Neuromuscular     [] Vasocompression     [] Gait Training     [] Dry needling        [] 1 or 2 muscles        [] 3 or more muscles     []  Other     Total Treatment time 43 3     Time In: 8:15 am         Time Out: 8:58 am    Electronically signed by:  Chelsey Cr PT

## 2024-05-02 ENCOUNTER — HOSPITAL ENCOUNTER (OUTPATIENT)
Dept: PHYSICAL THERAPY | Age: 79
Setting detail: THERAPIES SERIES
Discharge: HOME | End: 2024-05-02
Payer: MEDICARE

## 2024-05-02 PROCEDURE — 97110 THERAPEUTIC EXERCISES: CPT

## 2024-05-02 NOTE — FLOWSHEET NOTE
[facility]   [address]   Phone: [phone]   Fax: [phone]    Physical Therapy Daily Treatment Note      Date:  2024  Patient Name:  Aster Edward    :  1945  MRN: [id number]  Physician: Melissa Fisher MD     Insurance: [facility] Medicare with visits BMN (4 modalities per day )   Diagnosis:  S43.016A (ICD-10-CM) - Anterior shoulder dislocation, initial encounter         Onset Date: 2024 first dislocation , 3/7/2024 second dislocation      Next  Appt: 2024   Visit# / total visits:   Cancels/No Shows: 0/0    Precautions: see below, following anterior shoulder dislocation guidelines (hard copy in pt's file folder), fall risk  Per EPIC 2024: \"She may use her arm for light activities of daily living and avoiding combining shoulder abduction and external rotation.\" \"She can wean out of her sling.\"      Per EPIC: Work on R shoulder following guidelines for anterior shoulder dislocation. Guidelines provided to pt. Eval and treat. Modalities as needed. Teach home exercise program.   2-3 times a week for 4-6 weeks.    --CURRENTLY FOLLOWING PHASE I OF DISLOCATION GUIDELINES    Subjective:  Patient continues to report discomfort with AROM at end ranges.  The patient also reports pain in her bicep when cutting food when she is cooking.    Pain:  [] Yes  [x] No Location:  N/A Pain Rating: (0-10 scale) 0/10  Pain altered Tx:  [x] No  [] Yes  Action:  Comments:     Objective:  INTERVENTIONS  INTERVENTIONS  Reps/ Time Weight/ Level Completed  Today Comments          MODALITIES        Vasocompression R Shoulder 10 min   40 degrees , low compression          MANUAL        STM to medial delt and subscap region 8mins  x           EXERCISES        Standing:       Pulley Flex / ABD 15 x eac  x    Finger Wall Flex / ABD 10 x 3\" Flex  5 x 3\" ABD  x Flex 16  ABD 16   Rows 12 x 2 red   progressed TB  Increase reps    RUE IR / ER walk out

## 2024-05-07 ENCOUNTER — HOSPITAL ENCOUNTER (OUTPATIENT)
Dept: PHYSICAL THERAPY | Age: 79
Setting detail: THERAPIES SERIES
Discharge: HOME | End: 2024-05-07
Payer: MEDICARE

## 2024-05-07 PROCEDURE — 97110 THERAPEUTIC EXERCISES: CPT

## 2024-05-07 NOTE — FLOWSHEET NOTE
[facility]   [address]   Phone: [phone]   Fax: [phone]    Physical Therapy Daily Treatment Note      Date:  2024  Patient Name:  Aster Edward    :  1945  MRN: [id number]  Physician: Melissa Fisher MD     Insurance: [facility] Medicare with visits BMN (4 modalities per day )   Diagnosis:  S43.016A (ICD-10-CM) - Anterior shoulder dislocation, initial encounter         Onset Date: 2024 first dislocation , 3/7/2024 second dislocation      Next  Appt: 2024   Visit# / total visits:   Cancels/No Shows: 0/0    Precautions: see below, following anterior shoulder dislocation guidelines (hard copy in pt's file folder), fall risk  Per EPIC 2024: \"She may use her arm for light activities of daily living and avoiding combining shoulder abduction and external rotation.\" \"She can wean out of her sling.\"      Per EPIC: Work on R shoulder following guidelines for anterior shoulder dislocation. Guidelines provided to pt. Eval and treat. Modalities as needed. Teach home exercise program.   2-3 times a week for 4-6 weeks.    --CURRENTLY FOLLOWING PHASE I OF DISLOCATION GUIDELINES    Subjective: The patient reports that she continues to notice improvement with her ROM but still feels mild discomfort at end ranges.    Pain:  [] Yes  [x] No Location:  N/A Pain Rating: (0-10 scale) 0/10  Pain altered Tx:  [x] No  [] Yes  Action:  Comments:     Objective:  INTERVENTIONS  INTERVENTIONS  Reps/ Time Weight/ Level Completed  Today Comments          MODALITIES        Vasocompression R Shoulder 10 min   40 degrees , low compression          MANUAL        STM to medial delt and subscap region 8mins             EXERCISES        Standing:       Pulley Flex / ABD 15 x eac  x    Finger Wall Flex / ABD 10 x 3\" Flex  5 x 3\" ABD  x Flex 16  ABD 16   Rows 12 x 2 red   progressed TB  Increase reps    RUE IR / ER walk out  10 x eac yellow

## 2024-05-09 ENCOUNTER — HOSPITAL ENCOUNTER (OUTPATIENT)
Dept: PHYSICAL THERAPY | Age: 79
Setting detail: THERAPIES SERIES
Discharge: HOME | End: 2024-05-09
Payer: MEDICARE

## 2024-05-09 PROCEDURE — 97110 THERAPEUTIC EXERCISES: CPT

## 2024-05-09 PROCEDURE — 97140 MANUAL THERAPY 1/> REGIONS: CPT

## 2024-05-09 NOTE — FLOWSHEET NOTE
[facility] Outpatient Physical Therapy   [address]   Phone: [phone]   Fax: [phone]    Physical Therapy Daily Treatment Note      Date:  2024  Patient Name:  Aster Edward    :  1945  MRN: [id number]  Physician: Melissa Fisher MD     Insurance: [facility] Medicare with visits BMN (4 modalities per day )   Diagnosis:  S43.016A (ICD-10-CM) - Anterior shoulder dislocation, initial encounter         Onset Date: 2024 first dislocation , 3/7/2024 second dislocation      Next  Appt: 2024   Visit# / total visits:   Cancels/No Shows: 0/0    Precautions: see below, following anterior shoulder dislocation guidelines (hard copy in pt's file folder), fall risk  Per EPIC 2024: \"She may use her arm for light activities of daily living and avoiding combining shoulder abduction and external rotation.\" \"She can wean out of her sling.\"      Per EPIC: Work on R shoulder following guidelines for anterior shoulder dislocation. Guidelines provided to pt. Eval and treat. Modalities as needed. Teach home exercise program.   2-3 times a week for 4-6 weeks.    --CURRENTLY FOLLOWING PHASE I OF DISLOCATION GUIDELINES    Subjective: Patient demonstrating improved tolerance to ROM.  Improved shoulder flexion but still notes discomfort in ABD.  Patient reporting putting dishes away has improved.  Patient demonstrating improved tolerance to reaching behind her head with no pain.  Patient reports she had increased swelling after manual 2 session ago ; pt stating she iced for pain management.    Pain:  [] Yes  [x] No Location:  N/A Pain Rating: (0-10 scale) 0/10  Pain altered Tx:  [x] No  [] Yes  Action:  Comments:     Objective:  INTERVENTIONS  INTERVENTIONS  Reps/ Time Weight/ Level Completed  Today Comments          MODALITIES        Vasocompression R Shoulder 10 min   40 degrees , low compression          MANUAL        STM to medial delt and subscap region 8mins

## 2024-05-13 ENCOUNTER — OFFICE VISIT (OUTPATIENT)
Dept: ORTHOPEDIC SURGERY | Age: 79
End: 2024-05-13
Payer: MEDICARE

## 2024-05-13 VITALS — HEIGHT: 60 IN | RESPIRATION RATE: 14 BRPM | WEIGHT: 178 LBS | BODY MASS INDEX: 34.95 KG/M2

## 2024-05-13 DIAGNOSIS — S43.016A ANTERIOR SHOULDER DISLOCATION, INITIAL ENCOUNTER: Primary | ICD-10-CM

## 2024-05-13 PROCEDURE — 99212 OFFICE O/P EST SF 10 MIN: CPT | Performed by: ORTHOPAEDIC SURGERY

## 2024-05-13 PROCEDURE — G8427 DOCREV CUR MEDS BY ELIG CLIN: HCPCS | Performed by: ORTHOPAEDIC SURGERY

## 2024-05-13 PROCEDURE — G8399 PT W/DXA RESULTS DOCUMENT: HCPCS | Performed by: ORTHOPAEDIC SURGERY

## 2024-05-13 PROCEDURE — 1123F ACP DISCUSS/DSCN MKR DOCD: CPT | Performed by: ORTHOPAEDIC SURGERY

## 2024-05-13 PROCEDURE — G8417 CALC BMI ABV UP PARAM F/U: HCPCS | Performed by: ORTHOPAEDIC SURGERY

## 2024-05-13 PROCEDURE — 1090F PRES/ABSN URINE INCON ASSESS: CPT | Performed by: ORTHOPAEDIC SURGERY

## 2024-05-13 PROCEDURE — 1036F TOBACCO NON-USER: CPT | Performed by: ORTHOPAEDIC SURGERY

## 2024-05-13 NOTE — PROGRESS NOTES
HPI: Ms. Edward is a 78-year-old lady here today for reevaluation of her right shoulder.  When she was last seen in my clinic about 6 weeks ago she was having a lot of pain and dysfunction.  A prescription for therapy was provided and since then she states that she has noticed significant improvement in pain and overall function.    On exam today she has approximately 130 degrees of active shoulder elevation with 125 degrees of abduction and 60 degrees of external rotation.  She has 5/5 muscle strength with resisted internal/external rotation with her arm at her side and 4+/5 muscle strength with supraspinatus testing.    Impression and plan: Ms. Edward is a 78-year-old lady approximately 2 and half months out from an anterior right shoulder dislocation.  She appears to be doing quite well responding appropriately to therapy.  Consequently at this time organ to continue pursuing conservative management.  She was encouraged to keep up with physical therapy and may continue to gradually increase use of this arm as she feels comfortable.  I will see her back for reevaluation in 2 months but she was encouraged to return or call earlier with any questions or concerns.

## 2024-05-14 ENCOUNTER — HOSPITAL ENCOUNTER (OUTPATIENT)
Dept: PHYSICAL THERAPY | Age: 79
Setting detail: THERAPIES SERIES
Discharge: HOME | End: 2024-05-14
Payer: MEDICARE

## 2024-05-14 PROCEDURE — 97110 THERAPEUTIC EXERCISES: CPT

## 2024-05-14 NOTE — PROGRESS NOTES
[facility] Outpatient Physical Therapy   [address]   Phone: [phone]   Fax: [phone]    Physical Therapy Daily Treatment Note / Progress Note      Date:  2024  Patient Name:  Aster Edward    :  1945  MRN: [id number]  Physician: Melissa Fisher MD     Insurance: [employer] Medicare with visits BMN (4 modalities per day )   Diagnosis:  S43.016A (ICD-10-CM) - Anterior shoulder dislocation, initial encounter         Onset Date: 2024 first dislocation , 3/7/2024 second dislocation      Next DrChela Appt: 2024   Visit# / total visits: 10/20  Cancels/No Shows: 0/0    Formal reporting period: 2024 - 2024    Precautions: see below, following anterior shoulder dislocation guidelines (hard copy in pt's file folder), fall risk  Per EPIC 2024: \"...keep up with physical therapy and may continue to gradually increase use of this arm as she feels comfortable \"    Per EPIC: Work on R shoulder following guidelines for anterior shoulder dislocation. Guidelines provided to pt. Eval and treat. Modalities as needed. Teach home exercise program.   2-3 times a week for 4-6 weeks.    --CURRENTLY FOLLOWING PHASE I OF DISLOCATION GUIDELINES, START PROGRESSING AS TOLERATED     Subjective:   Pt went and saw Dr. Fisher on 2024 with what he said noted above. She reports that she notices her R shoulder is improving overall with both strength and motion. Pain is overall decreasing as well per pt with reporting with cooking and washing dishes. Still avoid ABD and ER combined but reports that Dr. Fisher did not say anything yesterday on if this is still not allowed or is.    Pain:  [] Yes  [x] No Location:  N/A Pain Rating: (0-10 scale) 0/10  Pain altered Tx:  [x] No  [] Yes  Action:  Comments:   Pain at worst: 5/10    Objective:       Range of Motion  Left Range of Motion  Right   Shoulder Flexion  AROM   Shoulder Abduction  AROM  pain   Shoulder Extension

## 2024-05-16 ENCOUNTER — HOSPITAL ENCOUNTER (OUTPATIENT)
Dept: PHYSICAL THERAPY | Age: 79
Setting detail: THERAPIES SERIES
Discharge: HOME | End: 2024-05-16
Payer: MEDICARE

## 2024-05-16 PROCEDURE — 97140 MANUAL THERAPY 1/> REGIONS: CPT

## 2024-05-16 PROCEDURE — 97110 THERAPEUTIC EXERCISES: CPT

## 2024-05-16 NOTE — FLOWSHEET NOTE
Other:    [x] Patient would continue to benefit from skilled physical therapy services in order to: decrease pain and increase ROM and strength at the RUE to work towards improving functional mobility at PLOF.     GOALS: Updated / Assessed 5/14/2024  STG: (to be met in 10 treatments)  Pt will self report worst pain no greater than 6-7/10 in order to better tolerate ADLs/work activities with minimal dysfunction -GOAL MET 5/14  Pt will improve AROM in R shoulder flexion 105 in order to demonstrate ability to move/reach in all planes unrestricted at PLOF-GOAL MET 5/14  Pt will improve AROM in R shoulder ABD to 90 in order to demonstrate ability to move/reach in all planes unrestricted at PLOF -GOAL MET 5/14  Pt will improve AROM in R shoulder ER to 65 in order to demonstrate ability to move/reach in all planes unrestricted at PLOF -GOAL PROGRESSING 5/14  LTG: (to be met in 20 treatments)  Pt will demonstrate improved R UE strength to 4/5 or greater in order to demonstrate improved stability/strength necessary for unrestricted ADLs/work activities  Pt will increase 28 to 48 on UEFI in order to demonstrate improved functional tolerances at PLOF with minimal restriction/dysfunction  Pt will demonstrate independence with a long term HEP for continued progress/maintenance after completion of PT  Pt will improve AROM in R shoulder ABD and flexion to WFL in order to demonstrate ability to move/reach in all planes unrestricted at PLOF  Pt will be able to perform Apley ER at RUE to C3 to be able to wash her hair without difficulty at PLOF to show improved mobility to move/reach  Pt will report little bit to no difficulty on UEFI for cutting / preparing food to show improved functional tolerances at PLOF  Pt will report no falls for x6 weeks demonstrating improved safety with mobility and implementation of fall prevention strategies.       Plan: [x] Continue per plan of care.   [] Other:      Treatment Charges: Mins Units   []

## 2024-05-20 ENCOUNTER — HOSPITAL ENCOUNTER (OUTPATIENT)
Dept: PHYSICAL THERAPY | Age: 79
Setting detail: THERAPIES SERIES
Discharge: HOME | End: 2024-05-20
Payer: MEDICARE

## 2024-05-20 PROCEDURE — 97110 THERAPEUTIC EXERCISES: CPT

## 2024-05-20 NOTE — FLOWSHEET NOTE
[facility] Outpatient Physical Therapy   [address]   Phone: [phone]   Fax: [phone]    Physical Therapy Daily Treatment Note      Date:  2024  Patient Name:  Aster Edward    :  1945  MRN: [id number]  Physician: Melissa Fisher MD     Insurance: [facility] Medicare with visits BMN (4 modalities per day )   Diagnosis:  S43.016A (ICD-10-CM) - Anterior shoulder dislocation, initial encounter         Onset Date: 2024 first dislocation , 3/7/2024 second dislocation      Next DrChela Appt: 2024   Visit# / total visits:   Cancels/No Shows: 0/0      Precautions: see below, following anterior shoulder dislocation guidelines (hard copy in pt's file folder), fall risk  Per EPIC 2024: \"...keep up with physical therapy and may continue to gradually increase use of this arm as she feels comfortable \"    Per EPIC: Work on R shoulder following guidelines for anterior shoulder dislocation. Guidelines provided to pt. Eval and treat. Modalities as needed. Teach home exercise program.   2-3 times a week for 4-6 weeks.    --CURRENTLY FOLLOWING PHASE I OF DISLOCATION GUIDELINES, START PROGRESSING AS TOLERATED     Subjective:   Pt reports that she was working on her yard this past weekend with pain only getting to a 5/10 with doing it. No other complaints, was not able to do her HEP.    Pain:  [] Yes  [x] No Location:  N/A Pain Rating: (0-10 scale) 0/10  Pain altered Tx:  [x] No  [] Yes  Action:  Comments:   Pain at worst: 5/10    Objective:    INTERVENTIONS  INTERVENTIONS  Reps/ Time Weight/ Level Completed  Today Comments          MODALITIES        Vasocompression R Shoulder 10 min   40 degrees , low compression          MANUAL        STM to medial delt and subscap region 8mins      Subscapular release 5 mins  x    Scap mobs ML/inferior, superior 3'      EXERCISES        Standing:       Pulley Flex / ABD 15 x eac      Finger Wall Flex / ABD 10 x 3\" Flex  5 x 3\" ABD

## 2024-05-23 ENCOUNTER — HOSPITAL ENCOUNTER (OUTPATIENT)
Dept: PHYSICAL THERAPY | Age: 79
Setting detail: THERAPIES SERIES
Discharge: HOME | End: 2024-05-23
Payer: MEDICARE

## 2024-05-23 PROCEDURE — 97110 THERAPEUTIC EXERCISES: CPT

## 2024-05-23 NOTE — FLOWSHEET NOTE
progress/maintenance after completion of PT  Pt will improve AROM in R shoulder ABD and flexion to WFL in order to demonstrate ability to move/reach in all planes unrestricted at PLOF  Pt will be able to perform Apley ER at RUE to C3 to be able to wash her hair without difficulty at PLOF to show improved mobility to move/reach  Pt will report little bit to no difficulty on UEFI for cutting / preparing food to show improved functional tolerances at PLOF  Pt will report no falls for x6 weeks demonstrating improved safety with mobility and implementation of fall prevention strategies.       Plan: [x] Continue per plan of care.   [] Other:      Treatment Charges: Mins Units   []  Modalities     [x]  Ther Exercise 38 3   [x]  Manual Therapy 3 ---   []  Ther Activities     []  Aquatics     []  Neuromuscular     [] Vasocompression     [] Gait Training     [] Dry needling        [] 1 or 2 muscles        [] 3 or more muscles     []  Other     Total Treatment time 41 3     Time In: 0849         Time Out: 0930    Electronically signed by:  Estefani Shaw PTA

## 2024-05-28 ENCOUNTER — HOSPITAL ENCOUNTER (OUTPATIENT)
Dept: PHYSICAL THERAPY | Age: 79
Setting detail: THERAPIES SERIES
Discharge: HOME | End: 2024-05-28
Payer: MEDICARE

## 2024-05-28 PROCEDURE — 97110 THERAPEUTIC EXERCISES: CPT

## 2024-05-28 NOTE — FLOWSHEET NOTE
improved tolerance   Mat Level:       PROM R Shoulder Flex / ABD/ ER @ 45 degrees 10 x     4/18 inc sets  TC needed for proper ABD execution only 1 set due to time   Supine AROM Flexion 10x 1#  5/7 added flexion-  added in supine as pt continues to have pain and discomfort with AROM in seated   Side lying ER 10x2 1#  5/7 added  Weight added 5/20   Side lying ABD 10x2 1#  5/9 performed post manual with improved tolerance noted  Weight added 5/20   PROM D2 Flex / Ext 10 x   Small ranges   Scapular Punches 10x2 1#  Inc sets 4/18  Added weight 4/23  Increase reps 4/25   RUE Rhythmic Stabilization Perturbations  3 x 15\"   Added 4/30   Posterior capsule stretch 3x30\"   Ran out of time 4/23   Other:    Patient Education/Home Program :   Access Code: [email or web address]  URL: [email or web address]  Date: 04/12/2024  Prepared by: Chelsey Cr     Exercises  - Seated Scapular Retraction  - 1 x daily - 5 x weekly - 2 sets - 10 reps - 3 seconds hold  - Seated Shoulder Shrugs  - 1 x daily - 5 x weekly - 2 sets - 10 reps  - Seated Single Arm Bicep Curls with Rotation and Dumbbell  - 1 x daily - 5 x weekly - 2 sets - 10 reps  - Seated Bicep Curls Neutral with Dumbbells  - 1 x daily - 5 x weekly - 2 sets - 10 reps     5/7/24  - Sidelying Shoulder External Rotation  - 1 x daily - 7 x weekly - 3 sets - 10 reps  - Sidelying Shoulder Abduction Palm Forward  - 1 x daily - 7 x weekly - 3 sets - 10 reps    5/16/2024  - Shoulder PNF D2 Extension  - 1 x daily - 4-5 x weekly - 2 sets - 10 reps  - Single Arm Shoulder Flexion with Dumbbell  - 1 x daily - 4-5 x weekly - 2 sets - 10 reps  - Shoulder Abduction with Dumbbells - Thumbs Up  - 1 x daily - 4-5 x weekly - 2 sets - 10 reps    5/20/24  Verbally added upper trap and levator stretch to HEP    Pt. Education:  [x] Yes  [] No  [] Reviewed Prior HEP/Ed  Method of Education: [x] Verbal  [x] Demo  [] Written  Comprehension of Education:  [x] Verbalizes understanding.  [x] Demonstrates

## 2024-05-30 ENCOUNTER — HOSPITAL ENCOUNTER (OUTPATIENT)
Dept: PHYSICAL THERAPY | Age: 79
Setting detail: THERAPIES SERIES
Discharge: HOME | End: 2024-05-30
Payer: MEDICARE

## 2024-05-30 PROCEDURE — 97110 THERAPEUTIC EXERCISES: CPT

## 2024-05-30 NOTE — FLOWSHEET NOTE
[facility] Outpatient Physical Therapy   [address]   Phone: [phone]   Fax: [phone]    Physical Therapy Daily Treatment Note      Date:  2024  Patient Name:  Aster Edward    :  1945  MRN: [id number]  Physician: Melissa Fisher MD     Insurance: [facility] Medicare with visits BMN (4 modalities per day )   Diagnosis:  S43.016A (ICD-10-CM) - Anterior shoulder dislocation, initial encounter         Onset Date: 2024 first dislocation , 3/7/2024 second dislocation      Next DrChela Appt: 2024   Visit# / total visits: 15/20  Cancels/No Shows: 0/0      Precautions: see below, following anterior shoulder dislocation guidelines (hard copy in pt's file folder), fall risk  Per EPIC 2024: \"...keep up with physical therapy and may continue to gradually increase use of this arm as she feels comfortable \"    Per EPIC: Work on R shoulder following guidelines for anterior shoulder dislocation. Guidelines provided to pt. Eval and treat. Modalities as needed. Teach home exercise program.   2-3 times a week for 4-6 weeks.    --CURRENTLY FOLLOWING PHASE I OF DISLOCATION GUIDELINES, START PROGRESSING AS TOLERATED     Subjective:   Patient reporting to therapy stating her arthritis was bothering her this morning stating she took aspirin before coming to therapy.      Pain:  [] Yes  [x] No Location:  N/A Pain Rating: (0-10 scale) 0/10  Pain altered Tx:  [x] No  [] Yes  Action:  Comments:   Pain at worst: 5/10    Objective:    INTERVENTIONS  INTERVENTIONS  Reps/ Time Weight/ Level Completed  Today Comments          MODALITIES        Vasocompression R Shoulder 10 min   40 degrees , low compression          MANUAL        STM to medial delt and subscap region 3mins      Subscapular release 5 mins      Scap mobs ML/inferior, superior 3'      EXERCISES        Standing:       Pulley Flex / ABD 15 x eac      Finger Wall Flex / ABD 10 x 3\" Flex  5 x 3\" ABD   Flex 17  ABD 16   Rows 12 x

## 2024-06-04 ENCOUNTER — HOSPITAL ENCOUNTER (OUTPATIENT)
Dept: PHYSICAL THERAPY | Age: 79
Setting detail: THERAPIES SERIES
Discharge: HOME | End: 2024-06-04
Payer: MEDICARE

## 2024-06-04 PROCEDURE — 97140 MANUAL THERAPY 1/> REGIONS: CPT

## 2024-06-04 PROCEDURE — 97110 THERAPEUTIC EXERCISES: CPT

## 2024-06-04 NOTE — FLOWSHEET NOTE
[facility] Outpatient Physical Therapy   [address]   Phone: [phone]   Fax: [phone]    Physical Therapy Daily Treatment Note      Date:  2024  Patient Name:  Aster Edward    :  1945  MRN: [id number]  Physician: Melissa Fisher MD     Insurance: [facility] Medicare with visits BMN (4 modalities per day )   Diagnosis:  S43.016A (ICD-10-CM) - Anterior shoulder dislocation, initial encounter         Onset Date: 2024 first dislocation , 3/7/2024 second dislocation      Next  Appt: 2024   Visit# / total visits:   Cancels/No Shows: 0/0      Precautions: see below, following anterior shoulder dislocation guidelines (hard copy in pt's file folder), fall risk  Per EPIC 2024: \"...keep up with physical therapy and may continue to gradually increase use of this arm as she feels comfortable \"    Per EPIC: Work on R shoulder following guidelines for anterior shoulder dislocation. Guidelines provided to pt. Eval and treat. Modalities as needed. Teach home exercise program.   2-3 times a week for 4-6 weeks.    --CURRENTLY FOLLOWING PHASE I OF DISLOCATION GUIDELINES, START PROGRESSING AS TOLERATED     Subjective:   Patient comes in sating her shoulder is doing good. No new pains or complaints. She does still note though to have pain with cutting food where the pain is mainly at middle and anterior deltoid area as well as some in her triceps.     Pain:  [] Yes  [x] No Location:  N/A Pain Rating: (0-10 scale) 0/10  Pain altered Tx:  [x] No  [] Yes  Action:  Comments:   Pain at worst: 5/10    Objective:    INTERVENTIONS  INTERVENTIONS  Reps/ Time Weight/ Level Completed  Today Comments          MODALITIES        Vasocompression R Shoulder 10 min   40 degrees , low compression          MANUAL        STM to medial delt and subscap region 3mins      Subscapular release 5 mins      Scap mobs ML/inferior, superior 3'      EXERCISES        Standing:       Pulley Flex /

## 2024-06-06 ENCOUNTER — APPOINTMENT (OUTPATIENT)
Dept: PHYSICAL THERAPY | Age: 79
End: 2024-06-06
Payer: MEDICARE

## 2024-06-07 ENCOUNTER — HOSPITAL ENCOUNTER (OUTPATIENT)
Dept: PHYSICAL THERAPY | Age: 79
Setting detail: THERAPIES SERIES
Discharge: HOME | End: 2024-06-07
Payer: MEDICARE

## 2024-06-07 PROCEDURE — 97110 THERAPEUTIC EXERCISES: CPT

## 2024-06-07 NOTE — FLOWSHEET NOTE
[facility] Outpatient Physical Therapy   [address]   Phone: [phone]   Fax: [phone]    Physical Therapy Daily Treatment Note      Date:  2024  Patient Name:  Aster Edward    :  1945  MRN: [id number]  Physician: Melissa Fisher MD     Insurance: [facility] Medicare with visits BMN (4 modalities per day )   Diagnosis:  S43.016A (ICD-10-CM) - Anterior shoulder dislocation, initial encounter         Onset Date: 2024 first dislocation , 3/7/2024 second dislocation      Next DrChela Appt: 2024   Visit# / total visits:   Cancels/No Shows: 0/0      Precautions: see below, following anterior shoulder dislocation guidelines (hard copy in pt's file folder), fall risk  Per EPIC 2024: \"...keep up with physical therapy and may continue to gradually increase use of this arm as she feels comfortable \"    Per EPIC: Work on R shoulder following guidelines for anterior shoulder dislocation. Guidelines provided to pt. Eval and treat. Modalities as needed. Teach home exercise program.   2-3 times a week for 4-6 weeks.    --CURRENTLY FOLLOWING PHASE I OF DISLOCATION GUIDELINES, START PROGRESSING AS TOLERATED     Subjective:   Patient reports that her shoulder has a little more pain coming in today after last session. Reports that HEP seems to help when shoulder is irrigated.     Pain:  [] Yes  [x] No Location:  R Shoulder           Pain Rating: (0-10 scale) 4/10  Pain altered Tx:  [x] No  [] Yes  Action:  Comments:       Objective:    INTERVENTIONS  INTERVENTIONS  Reps/ Time Weight/ Level Completed  Today Comments          MODALITIES        Vasocompression R Shoulder 10 min   40 degrees , low compression          MANUAL        STM to medial delt and subscap region 3mins      Subscapular release 5 mins      Scap mobs ML/inferior, superior 3'      EXERCISES        Standing:       Pulley Flex / ABD 15 x eac      Finger Wall Flex / ABD 10 x 3\" Flex  5 x 3\" ABD   Flex 17  ABD

## 2024-06-11 ENCOUNTER — HOSPITAL ENCOUNTER (OUTPATIENT)
Dept: PHYSICAL THERAPY | Age: 79
Setting detail: THERAPIES SERIES
Discharge: HOME | End: 2024-06-11
Payer: MEDICARE

## 2024-06-11 PROCEDURE — 97110 THERAPEUTIC EXERCISES: CPT

## 2024-06-11 NOTE — FLOWSHEET NOTE
daily - 4-5 x weekly - 2 sets - 10 reps    5/20/24  Verbally added upper trap and levator stretch to HEP    Pt. Education:  [x] Yes  [] No  [] Reviewed Prior HEP/Ed  Method of Education: [x] Verbal  [x] Demo  [] Written  Comprehension of Education:  [x] Verbalizes understanding.  [x] Demonstrates understanding.  [] Needs review.  [] Demonstrates/verbalizes HEP/Ed previously given.       Specific Instructions for next treatment progress through phases as tolerated, RUE strengthening as tolerated, AAROM/AROM pain free ranges, review HEP, manual as needed       Assessment: [x] Progressing toward goals.  Began with UBE at the beginning of session to improve tolerance to mobility.  Continued with scapular strengthening and ROM.  Added wrist flexion and extension stretches this date to decrease tightness in the RUE with mobility.  Added doorway stretch to decrease anterior shoulder tightness as she reports the most discomfort in this region with overhead mobility.  Decreased weight and resistance as charted above due to the patient demonstrating compensatory movement to achieve ROM.  Continued to provide pt tactile and verbal cues to correct technique and posture during exercises for proper execution.  Patient reported feeling sore post session.    [] No change.       [] Other:    [x] Patient would continue to benefit from skilled physical therapy services in order to: decrease pain and increase ROM and strength at the RUE to work towards improving functional mobility at PLOF.     GOALS: Updated / Assessed 5/14/2024  STG: (to be met in 10 treatments)  Pt will self report worst pain no greater than 6-7/10 in order to better tolerate ADLs/work activities with minimal dysfunction -GOAL MET 5/14  Pt will improve AROM in R shoulder flexion 105 in order to demonstrate ability to move/reach in all planes unrestricted at PLOF-GOAL MET 5/14  Pt will improve AROM in R shoulder ABD to 90 in order to demonstrate ability to move/reach

## 2024-06-13 ENCOUNTER — HOSPITAL ENCOUNTER (OUTPATIENT)
Dept: PHYSICAL THERAPY | Age: 79
Setting detail: THERAPIES SERIES
Discharge: HOME | End: 2024-06-13
Payer: MEDICARE

## 2024-06-13 PROCEDURE — 97110 THERAPEUTIC EXERCISES: CPT

## 2024-06-13 NOTE — FLOWSHEET NOTE
[facility] Outpatient Physical Therapy   [address]   Phone: [phone]   Fax: [phone]    Physical Therapy Daily Treatment Note      Date:  2024  Patient Name:  Aster Edward    :  1945  MRN: [id number]  Physician: Melissa Fisher MD     Insurance: [facility] Medicare with visits BMN (4 modalities per day )   Diagnosis:  S43.016A (ICD-10-CM) - Anterior shoulder dislocation, initial encounter         Onset Date: 2024 first dislocation , 3/7/2024 second dislocation      Next  Appt: 2024   Visit# / total visits:   Cancels/No Shows: 0/0      Precautions: see below, following anterior shoulder dislocation guidelines (hard copy in pt's file folder), fall risk  Per EPIC 2024: \"...keep up with physical therapy and may continue to gradually increase use of this arm as she feels comfortable \"    Per EPIC: Work on R shoulder following guidelines for anterior shoulder dislocation. Guidelines provided to pt. Eval and treat. Modalities as needed. Teach home exercise program.   2-3 times a week for 4-6 weeks.    --CURRENTLY FOLLOWING PHASE I OF DISLOCATION GUIDELINES, START PROGRESSING AS TOLERATED     Subjective:   Patient reporting occasional tightness in the bicep/tricep region with reaching behind her back and behind her head.     Pain:  [] Yes  [x] No Location:  R Shoulder           Pain Rating: (0-10 scale)denies/10  Pain altered Tx:  [x] No  [] Yes  Action:  Comments:       Objective:    INTERVENTIONS  INTERVENTIONS  Reps/ Time Weight/ Level Completed  Today Comments          MODALITIES        Vasocompression R Shoulder 10 min   40 degrees , low compression          MANUAL        STM to medial delt and subscap region 3mins      Subscapular release 5 mins      Scap mobs ML/inferior, superior 3'      EXERCISES        Standing:       Pulley Flex / ABD 15 x eac  x    Finger Wall Flex / ABD 10 x 3\" Flex  5 x 3\" ABD   Flex 17  ABD 16   Rows 10 x 2 Blue  x 4/18

## 2024-06-18 ENCOUNTER — HOSPITAL ENCOUNTER (OUTPATIENT)
Dept: PHYSICAL THERAPY | Age: 79
Setting detail: THERAPIES SERIES
Discharge: HOME | End: 2024-06-18
Payer: MEDICARE

## 2024-06-18 PROCEDURE — 97110 THERAPEUTIC EXERCISES: CPT

## 2024-06-18 NOTE — PROGRESS NOTES
[facility]     Exercises  - Seated Scapular Retraction  - 1 x daily - 5 x weekly - 2 sets - 10 reps - 3 seconds hold  - Seated Shoulder Shrugs  - 1 x daily - 5 x weekly - 2 sets - 10 reps  - Seated Single Arm Bicep Curls with Rotation and Dumbbell  - 1 x daily - 5 x weekly - 2 sets - 10 reps  - Seated Bicep Curls Neutral with Dumbbells  - 1 x daily - 5 x weekly - 2 sets - 10 reps     5/7/24  - Sidelying Shoulder External Rotation  - 1 x daily - 7 x weekly - 3 sets - 10 reps  - Sidelying Shoulder Abduction Palm Forward  - 1 x daily - 7 x weekly - 3 sets - 10 reps    5/16/2024  - Shoulder PNF D2 Extension  - 1 x daily - 4-5 x weekly - 2 sets - 10 reps  - Single Arm Shoulder Flexion with Dumbbell  - 1 x daily - 4-5 x weekly - 2 sets - 10 reps  - Shoulder Abduction with Dumbbells - Thumbs Up  - 1 x daily - 4-5 x weekly - 2 sets - 10 reps    5/20/24  Verbally added upper trap and levator stretch to HEP    Added 6/18:  - Standing Shoulder Posterior Capsule Stretch  - 1 x daily - 3-4 x weekly - 3 sets - 1 reps - 30 seconds hold  - Seated Levator Scapulae Stretch  - 1 x daily - 3-4 x weekly - 3 sets - 1 reps - 30 seconds hold  - Seated Upper Trapezius Stretch  - 1 x daily - 3-4 x weekly - 3 sets - 1 reps - 30 seconds hold  - Standing Bicep Stretch at Wall  - 1 x daily - 3-4 x weekly - 3 sets - 1 reps - 30 seconds hold  - Standing Shoulder Horizontal Abduction with Resistance  - 1 x daily - 3-4 x weekly - 2 sets - 10 reps    Pt. Education:  [x] Yes  [] No  [] Reviewed Prior HEP/Ed  Method of Education: [x] Verbal  [x] Demo  [x] Written  Comprehension of Education:  [x] Verbalizes understanding.  [x] Demonstrates understanding.  [] Needs review.  [] Demonstrates/verbalizes HEP/Ed previously given.       Specific Instructions for next treatment progress through phases as tolerated, RUE strengthening as tolerated, AAROM/AROM pain free ranges, review HEP, manual as needed       Assessment: [x] Progressing toward goals.  Progress

## 2024-06-20 ENCOUNTER — HOSPITAL ENCOUNTER (OUTPATIENT)
Dept: PHYSICAL THERAPY | Age: 79
Setting detail: THERAPIES SERIES
Discharge: HOME | End: 2024-06-20
Payer: MEDICARE

## 2024-07-02 NOTE — DISCHARGE SUMMARY
Code: [email or web address]  URL: [email or web address]  Date: 04/12/2024  Prepared by: Chlesey Cr     Exercises  - Seated Scapular Retraction  - 1 x daily - 5 x weekly - 2 sets - 10 reps - 3 seconds hold  - Seated Shoulder Shrugs  - 1 x daily - 5 x weekly - 2 sets - 10 reps  - Seated Single Arm Bicep Curls with Rotation and Dumbbell  - 1 x daily - 5 x weekly - 2 sets - 10 reps  - Seated Bicep Curls Neutral with Dumbbells  - 1 x daily - 5 x weekly - 2 sets - 10 reps      5/7/24  - Sidelying Shoulder External Rotation  - 1 x daily - 7 x weekly - 3 sets - 10 reps  - Sidelying Shoulder Abduction Palm Forward  - 1 x daily - 7 x weekly - 3 sets - 10 reps     5/16/2024  - Shoulder PNF D2 Extension  - 1 x daily - 4-5 x weekly - 2 sets - 10 reps  - Single Arm Shoulder Flexion with Dumbbell  - 1 x daily - 4-5 x weekly - 2 sets - 10 reps  - Shoulder Abduction with Dumbbells - Thumbs Up  - 1 x daily - 4-5 x weekly - 2 sets - 10 reps     5/20/24  Verbally added upper trap and levator stretch to HEP     Added 6/18:  - Standing Shoulder Posterior Capsule Stretch  - 1 x daily - 3-4 x weekly - 3 sets - 1 reps - 30 seconds hold  - Seated Levator Scapulae Stretch  - 1 x daily - 3-4 x weekly - 3 sets - 1 reps - 30 seconds hold  - Seated Upper Trapezius Stretch  - 1 x daily - 3-4 x weekly - 3 sets - 1 reps - 30 seconds hold  - Standing Bicep Stretch at Wall  - 1 x daily - 3-4 x weekly - 3 sets - 1 reps - 30 seconds hold  - Standing Shoulder Horizontal Abduction with Resistance  - 1 x daily - 3-4 x weekly - 2 sets - 10 reps    GOALS: Updated / Assessed 6/18/2024  STG: (to be met in 10 treatments)  Pt will self report worst pain no greater than 6-7/10 in order to better tolerate ADLs/work activities with minimal dysfunction -GOAL MET 5/14  Pt will improve AROM in R shoulder flexion 105 in order to demonstrate ability to move/reach in all planes unrestricted at PLOF-GOAL MET 5/14  Pt will improve AROM in R shoulder ABD to 90 in order to

## 2024-07-08 ENCOUNTER — OFFICE VISIT (OUTPATIENT)
Dept: ORTHOPEDIC SURGERY | Age: 79
End: 2024-07-08
Payer: MEDICARE

## 2024-07-08 VITALS — BODY MASS INDEX: 35.23 KG/M2 | RESPIRATION RATE: 14 BRPM | WEIGHT: 179.45 LBS | HEIGHT: 60 IN

## 2024-07-08 DIAGNOSIS — S43.016A ANTERIOR SHOULDER DISLOCATION, INITIAL ENCOUNTER: Primary | ICD-10-CM

## 2024-07-08 PROCEDURE — 1036F TOBACCO NON-USER: CPT | Performed by: ORTHOPAEDIC SURGERY

## 2024-07-08 PROCEDURE — G8427 DOCREV CUR MEDS BY ELIG CLIN: HCPCS | Performed by: ORTHOPAEDIC SURGERY

## 2024-07-08 PROCEDURE — G8417 CALC BMI ABV UP PARAM F/U: HCPCS | Performed by: ORTHOPAEDIC SURGERY

## 2024-07-08 PROCEDURE — 1090F PRES/ABSN URINE INCON ASSESS: CPT | Performed by: ORTHOPAEDIC SURGERY

## 2024-07-08 PROCEDURE — 1123F ACP DISCUSS/DSCN MKR DOCD: CPT | Performed by: ORTHOPAEDIC SURGERY

## 2024-07-08 PROCEDURE — 99212 OFFICE O/P EST SF 10 MIN: CPT | Performed by: ORTHOPAEDIC SURGERY

## 2024-07-08 PROCEDURE — G8399 PT W/DXA RESULTS DOCUMENT: HCPCS | Performed by: ORTHOPAEDIC SURGERY

## 2024-07-08 NOTE — PROGRESS NOTES
HPI: Ms. Edward is a 79-year-old lady who is approximately 4 and half months out from a right shoulder anterior dislocation.  This was a first-time dislocation and she has been treated conservatively thus far with physical therapy.  She has completed physical therapy but is kept up with her home exercise program and states that she feels much better and continues to improve.    Physical examination:  Evaluation of the patient's right shoulder and upper extremity demonstrates intact skin without warmth erythema notable swelling.  She has approximately 140 degrees of active shoulder elevation, 130 degrees of abduction and 45 degrees of external rotation.  She has 5/5 muscle strength with resisted deltoid, supraspinatus, external rotation and internal rotation testing.  Sensation is grossly intact light touch in all dermatomes.    Impression and plan: Ms. Edward is a 79-year-old lady who is approximately 4 and half months out from a right shoulder anterior dislocation.  She has responded well to conservative management thus far.  She was encouraged to keep up with her home exercise program and may continue to use his arm as she feels comfortable for daily activities.  I will see her back for reevaluation as needed but she may return or call at anytime with questions or concerns

## 2024-08-27 ENCOUNTER — HOSPITAL ENCOUNTER (OUTPATIENT)
Age: 79
Setting detail: SPECIMEN
Discharge: HOME | End: 2024-08-27

## 2024-08-27 LAB
25(OH)D3 SERPL-MCNC: 53.4 NG/ML (ref 30–100)
ALBUMIN SERPL-MCNC: 4.5 G/DL (ref 3.5–5.2)
ALBUMIN/GLOB SERPL: 2 {RATIO} (ref 1–2.5)
ALP SERPL-CCNC: 50 U/L (ref 35–104)
ALT SERPL-CCNC: 18 U/L (ref 10–35)
ANION GAP SERPL CALCULATED.3IONS-SCNC: 9 MMOL/L (ref 9–16)
AST SERPL-CCNC: 25 U/L (ref 10–35)
BASOPHILS # BLD: 0.05 K/UL (ref 0–0.2)
BASOPHILS NFR BLD: 1 % (ref 0–2)
BILIRUB SERPL-MCNC: 0.3 MG/DL (ref 0–1.2)
BUN SERPL-MCNC: 23 MG/DL (ref 8–23)
CALCIUM SERPL-MCNC: 9.5 MG/DL (ref 8.6–10.4)
CHLORIDE SERPL-SCNC: 103 MMOL/L (ref 98–107)
CO2 SERPL-SCNC: 27 MMOL/L (ref 20–31)
CREAT SERPL-MCNC: 0.8 MG/DL (ref 0.5–0.9)
EOSINOPHIL # BLD: 0.4 K/UL (ref 0–0.44)
EOSINOPHILS RELATIVE PERCENT: 4 % (ref 1–4)
ERYTHROCYTE [DISTWIDTH] IN BLOOD BY AUTOMATED COUNT: 14.1 % (ref 11.8–14.4)
GFR, ESTIMATED: 74 ML/MIN/1.73M2
GLUCOSE SERPL-MCNC: 106 MG/DL (ref 74–99)
HCT VFR BLD AUTO: 38.1 % (ref 36.3–47.1)
HGB BLD-MCNC: 12.1 G/DL (ref 11.9–15.1)
IMM GRANULOCYTES # BLD AUTO: 0.03 K/UL (ref 0–0.3)
IMM GRANULOCYTES NFR BLD: 0 %
LYMPHOCYTES NFR BLD: 4.02 K/UL (ref 1.1–3.7)
LYMPHOCYTES RELATIVE PERCENT: 42 % (ref 24–43)
MCH RBC QN AUTO: 27.6 PG (ref 25.2–33.5)
MCHC RBC AUTO-ENTMCNC: 31.8 G/DL (ref 28.4–34.8)
MCV RBC AUTO: 86.8 FL (ref 82.6–102.9)
MONOCYTES NFR BLD: 0.71 K/UL (ref 0.1–1.2)
MONOCYTES NFR BLD: 8 % (ref 3–12)
NEUTROPHILS NFR BLD: 45 % (ref 36–65)
NEUTS SEG NFR BLD: 4.27 K/UL (ref 1.5–8.1)
NRBC BLD-RTO: 0 PER 100 WBC
PLATELET # BLD AUTO: 264 K/UL (ref 138–453)
PMV BLD AUTO: 10.6 FL (ref 8.1–13.5)
POTASSIUM SERPL-SCNC: 4.7 MMOL/L (ref 3.7–5.3)
PROT SERPL-MCNC: 6.6 G/DL (ref 6.6–8.7)
RBC # BLD AUTO: 4.39 M/UL (ref 3.95–5.11)
SODIUM SERPL-SCNC: 139 MMOL/L (ref 136–145)
WBC OTHER # BLD: 9.5 K/UL (ref 3.5–11.3)

## 2024-11-06 ENCOUNTER — HOSPITAL ENCOUNTER (EMERGENCY)
Age: 79
Discharge: HOME | End: 2024-11-06
Attending: EMERGENCY MEDICINE
Payer: MEDICARE

## 2024-11-06 VITALS
WEIGHT: 180 LBS | SYSTOLIC BLOOD PRESSURE: 165 MMHG | OXYGEN SATURATION: 99 % | DIASTOLIC BLOOD PRESSURE: 72 MMHG | HEIGHT: 60 IN | TEMPERATURE: 98.2 F | HEART RATE: 67 BPM | RESPIRATION RATE: 17 BRPM | BODY MASS INDEX: 35.34 KG/M2

## 2024-11-06 DIAGNOSIS — S39.012A BACK STRAIN, INITIAL ENCOUNTER: Primary | ICD-10-CM

## 2024-11-06 PROCEDURE — 96372 THER/PROPH/DIAG INJ SC/IM: CPT

## 2024-11-06 PROCEDURE — 6360000002 HC RX W HCPCS

## 2024-11-06 PROCEDURE — 99283 EMERGENCY DEPT VISIT LOW MDM: CPT

## 2024-11-06 PROCEDURE — 6370000000 HC RX 637 (ALT 250 FOR IP)

## 2024-11-06 RX ORDER — CYCLOBENZAPRINE HCL 10 MG
10 TABLET ORAL ONCE
Status: COMPLETED | OUTPATIENT
Start: 2024-11-06 | End: 2024-11-06

## 2024-11-06 RX ORDER — KETOROLAC TROMETHAMINE 30 MG/ML
15 INJECTION, SOLUTION INTRAMUSCULAR; INTRAVENOUS ONCE
Status: COMPLETED | OUTPATIENT
Start: 2024-11-06 | End: 2024-11-06

## 2024-11-06 RX ORDER — ACETAMINOPHEN 500 MG
1000 TABLET ORAL ONCE
Status: DISCONTINUED | OUTPATIENT
Start: 2024-11-06 | End: 2024-11-06

## 2024-11-06 RX ORDER — CYCLOBENZAPRINE HCL 10 MG
10 TABLET ORAL 3 TIMES DAILY PRN
Qty: 15 TABLET | Refills: 0 | Status: SHIPPED | OUTPATIENT
Start: 2024-11-06 | End: 2024-11-16

## 2024-11-06 RX ORDER — LIDOCAINE 4 G/G
1 PATCH TOPICAL DAILY
Status: DISCONTINUED | OUTPATIENT
Start: 2024-11-06 | End: 2024-11-06

## 2024-11-06 RX ADMIN — CYCLOBENZAPRINE 10 MG: 10 TABLET, FILM COATED ORAL at 21:22

## 2024-11-06 RX ADMIN — KETOROLAC TROMETHAMINE 15 MG: 30 INJECTION, SOLUTION INTRAMUSCULAR at 21:22

## 2024-11-06 ASSESSMENT — PAIN - FUNCTIONAL ASSESSMENT: PAIN_FUNCTIONAL_ASSESSMENT: 0-10

## 2024-11-06 ASSESSMENT — PAIN DESCRIPTION - ORIENTATION: ORIENTATION: RIGHT;MID

## 2024-11-06 ASSESSMENT — PAIN SCALES - GENERAL: PAINLEVEL_OUTOF10: 9

## 2024-11-06 ASSESSMENT — PAIN DESCRIPTION - FREQUENCY: FREQUENCY: CONTINUOUS

## 2024-11-06 ASSESSMENT — PAIN DESCRIPTION - LOCATION: LOCATION: BACK

## 2024-11-07 ASSESSMENT — ENCOUNTER SYMPTOMS
BACK PAIN: 1
NAUSEA: 0
SHORTNESS OF BREATH: 0
ABDOMINAL PAIN: 0
DIARRHEA: 0
VOMITING: 0
COUGH: 0

## 2024-11-07 NOTE — DISCHARGE INSTRUCTIONS
You were seen in the ED today for right upper rhomboid discomfort.  Symptoms are likely due to musculoskeletal pain.    Please take Flexeril 10 Mg once at bedtime to achieve relief of pain.  Please continue taking Tylenol and ibuprofen.  Apply ice to the affected region.

## 2024-11-07 NOTE — ED PROVIDER NOTES
[facility] ED  eMERGENCY dEPARTMENT eNCOUnter   Attending Attestation     Pt Name: Aster Edward  MRN: [id number]  Birthdate 1945  Date of evaluation: 11/6/24       Aster Edward is a 79 y.o. female who presents with Back Pain (Right mid back area since yesterday (sharp and constant) Took Baclofen 10 mg at 5:30pm today )      History:   Patient is here complaining of right, rhomboid area pain that is been ongoing for a day or so.  Patient cannot think of anything that she did injure that area.  Patient has chronic issues with that right shoulder and has had dislocations a couple times has been through physical therapy in the past.  Patient has no pain numbness tingling or weakness in the legs.  Patient has no abdominal pain no dysuria no hematuria.  Patient denies any shortness of breath or chest pain.    Exam: Vitals:   Vitals:    11/06/24 2029   BP: (!) 165/72   Pulse: 67   Resp: 17   Temp: 98.2 °F (36.8 °C)   TempSrc: Oral   SpO2: 99%   Weight: 81.6 kg (180 lb)   Height: 1.524 m (5')     Tenderness to palpation to the right rhomboid.  Neurologically intact.    I performed a history and physical examination of the patient and discussed management with the resident. I reviewed the resident’s note and agree with the documented findings and plan of care. Any areas of disagreement are noted on the chart. I was personally present for the key portions of any procedures. I have documented in the chart those procedures where I was not present during the key portions. I have personally reviewed all images and agree with the resident's interpretation. I have reviewed the emergency nurses triage note. I agree with the chief complaint, past medical history, past surgical history, allergies, medications, social and family history as documented unless otherwise noted below. Documentation of the HPI, Physical Exam and Medical Decision Making performed by medical students or scribes is based on my personal performance

## 2024-11-08 NOTE — ED PROVIDER NOTES
[facility] ED  Emergency Department Encounter  Emergency Medicine Resident     Pt Name:Aster Edward  MRN: [id number]  Birthdate 1945  Date of evaluation: 11/7/24  PCP:  Kari Gibbons MD  Note Started: 9:36 PM EST      CHIEF COMPLAINT       Chief Complaint   Patient presents with    Back Pain     Right mid back area since yesterday (sharp and constant) Took Baclofen 10 mg at 5:30pm today        HISTORY OF PRESENT ILLNESS  (Location/Symptom, Timing/Onset, Context/Setting, Quality, Duration, Modifying Factors, Severity.)      Aster Edward is a 79 y.o. female who presents with right-sided rhomboid back pain X 1 day.  Patient states she took baclofen 10 Mg which did not provide any symptomatic relief.  Patient has a history of right shoulder dislocation X2 with successful reduction earlier this year in February and March respectively.  Denies any recent trauma.    PAST MEDICAL / SURGICAL / SOCIAL / FAMILY HISTORY      has a past medical history of Abnormal cardiovascular stress test, Acute gouty arthropathy, Arthritis, Body mass index 36.0-36.9, adult, CAD (coronary artery disease), Fall, Fracture, shoulder, Hypertension, Other abnormal glucose, Other specified urticaria, Unspecified asthma(493.90), Unspecified hypertensive heart disease without heart failure, Unspecified vitamin D deficiency, and Wears glasses.       has a past surgical history that includes Cardiac catheterization; Bunionectomy (Right); tumor excision; Shoulder arthroscopy (Right, 07/14/2016); Shoulder arthroscopy (Right, 07/14/2016); Hysterectomy (10/02/2018); Cardiac catheterization (02/27/2024); and Cardiac procedure (N/A, 2/27/2024).      Social History     Socioeconomic History    Marital status:      Spouse name: Not on file    Number of children: Not on file    Years of education: Not on file    Highest education level: Not on file   Occupational History    Not on file   Tobacco Use    Smoking status: Never

## 2024-11-22 ENCOUNTER — OFFICE VISIT (OUTPATIENT)
Dept: ORTHOPEDIC SURGERY | Age: 79
End: 2024-11-22
Payer: MEDICARE

## 2024-11-22 VITALS — RESPIRATION RATE: 14 BRPM | HEIGHT: 61 IN | WEIGHT: 180 LBS | BODY MASS INDEX: 33.99 KG/M2

## 2024-11-22 DIAGNOSIS — S43.016A ANTERIOR SHOULDER DISLOCATION, INITIAL ENCOUNTER: ICD-10-CM

## 2024-11-22 DIAGNOSIS — M25.511 RIGHT SHOULDER PAIN, UNSPECIFIED CHRONICITY: ICD-10-CM

## 2024-11-22 DIAGNOSIS — M75.81 RIGHT ROTATOR CUFF TENDONITIS: Primary | ICD-10-CM

## 2024-11-22 PROCEDURE — G8484 FLU IMMUNIZE NO ADMIN: HCPCS

## 2024-11-22 PROCEDURE — G8428 CUR MEDS NOT DOCUMENT: HCPCS

## 2024-11-22 PROCEDURE — 1090F PRES/ABSN URINE INCON ASSESS: CPT

## 2024-11-22 PROCEDURE — G8417 CALC BMI ABV UP PARAM F/U: HCPCS

## 2024-11-22 PROCEDURE — 1123F ACP DISCUSS/DSCN MKR DOCD: CPT

## 2024-11-22 PROCEDURE — G8399 PT W/DXA RESULTS DOCUMENT: HCPCS

## 2024-11-22 PROCEDURE — 99214 OFFICE O/P EST MOD 30 MIN: CPT

## 2024-11-22 PROCEDURE — 1125F AMNT PAIN NOTED PAIN PRSNT: CPT

## 2024-11-22 PROCEDURE — 1036F TOBACCO NON-USER: CPT

## 2024-11-25 NOTE — PROGRESS NOTES
HPI: Ms. Edward is a 79-year-old female who presented to clinic today for follow-up evaluation of her right shoulder.  We have seen her in the past for a first-time anterior shoulder dislocation back in February of this year.  She had been doing very well but states over the last couple of months her shoulder started to bother her.  She states that there are times where she feels like the arm is kind of drooping due to the pain.  She states she has been taking Tylenol and Mobic for pain control.  She has not been in formal physical therapy since July but when she was in therapy states she was seeing some good relief.  On examination of the patient's right shoulder and upper extremity today she does have full range of motion at the shoulder.  She has 5/5 muscle strength testing at the deltoid, supraspinatus, external rotation, internal rotation although she does have some pain with supraspinatus testing.  She is significantly tender to palpation over the anterior lateral corner as well as some tenderness to palpation over the rhomboid and trapezius regions.  She does have a positive Neer impingement.  Negative Miller sign.  Negative belly press.    Imagin view x-rays of the right shoulder completed on 2024 were independently viewed demonstrating no obvious fracture, dislocation or subluxation.  Well-maintained glenohumeral joint spacing.  Mild degenerative changes at the acromioclavicular joint.  Mild calcific changes noted near the greater tuberosity.  Type II acromion.    Impression/plan: Ms. Edward is a 79-year-old female who presented to clinic today for follow-up evaluation of her right shoulder.  At this time I do believe she is doing with pain secondary to a rotator cuff tendinitis as she does have very good range of motion as well as well-maintained strength.  At this time I would recommend proceeding conservatively after a discussion with the patient about her conservative and surgical options.  I

## 2024-12-05 ENCOUNTER — OFFICE VISIT (OUTPATIENT)
Dept: ORTHOPEDIC SURGERY | Age: 79
End: 2024-12-05

## 2024-12-05 VITALS — HEIGHT: 61 IN | WEIGHT: 180 LBS | RESPIRATION RATE: 14 BRPM | BODY MASS INDEX: 33.99 KG/M2

## 2024-12-05 DIAGNOSIS — M75.81 RIGHT ROTATOR CUFF TENDONITIS: Primary | ICD-10-CM

## 2024-12-05 DIAGNOSIS — M25.511 RIGHT SHOULDER PAIN, UNSPECIFIED CHRONICITY: ICD-10-CM

## 2024-12-05 RX ORDER — TRIAMCINOLONE ACETONIDE 40 MG/ML
40 INJECTION, SUSPENSION INTRA-ARTICULAR; INTRAMUSCULAR ONCE
Status: COMPLETED | OUTPATIENT
Start: 2024-12-05 | End: 2024-12-05

## 2024-12-05 RX ORDER — LIDOCAINE HYDROCHLORIDE 10 MG/ML
3 INJECTION, SOLUTION INFILTRATION; PERINEURAL ONCE
Status: COMPLETED | OUTPATIENT
Start: 2024-12-05 | End: 2024-12-05

## 2024-12-05 RX ADMIN — TRIAMCINOLONE ACETONIDE 40 MG: 40 INJECTION, SUSPENSION INTRA-ARTICULAR; INTRAMUSCULAR at 16:24

## 2024-12-05 RX ADMIN — LIDOCAINE HYDROCHLORIDE 3 ML: 10 INJECTION, SOLUTION INFILTRATION; PERINEURAL at 16:23

## 2024-12-06 NOTE — PROGRESS NOTES
office with any questions or concerns that she may have.    Procedure: Right shoulder subacromial space injection  Following an appropriate discussion with the patient regarding the risks and benefits of the procedure she consented to proceed. her right shoulder was prepped using chlorhexadine solution. Using aseptic technique and through a posterior approach, her right shoulder subacromial space was injected with a 4 cc mixture of 1cc 40mg/ml kenalog and 3 cc of 1% lidocaine without epinephrine. A band aid was applied to the injection site. she tolerated the injection with no immediate adverse reactions.    Total time spent on visit: 25 minutes

## 2024-12-13 ENCOUNTER — HOSPITAL ENCOUNTER (EMERGENCY)
Age: 79
Discharge: HOME | End: 2024-12-13
Attending: STUDENT IN AN ORGANIZED HEALTH CARE EDUCATION/TRAINING PROGRAM
Payer: MEDICARE

## 2024-12-13 ENCOUNTER — APPOINTMENT (OUTPATIENT)
Dept: GENERAL RADIOLOGY | Age: 79
End: 2024-12-13
Payer: MEDICARE

## 2024-12-13 ENCOUNTER — APPOINTMENT (OUTPATIENT)
Dept: GENERAL RADIOLOGY | Age: 79
End: 2024-12-13
Attending: STUDENT IN AN ORGANIZED HEALTH CARE EDUCATION/TRAINING PROGRAM
Payer: MEDICARE

## 2024-12-13 VITALS
WEIGHT: 180 LBS | OXYGEN SATURATION: 95 % | RESPIRATION RATE: 19 BRPM | SYSTOLIC BLOOD PRESSURE: 111 MMHG | HEIGHT: 60 IN | HEART RATE: 61 BPM | BODY MASS INDEX: 35.34 KG/M2 | DIASTOLIC BLOOD PRESSURE: 53 MMHG | TEMPERATURE: 97.6 F

## 2024-12-13 DIAGNOSIS — S43.014A ANTERIOR DISLOCATION OF RIGHT SHOULDER, INITIAL ENCOUNTER: Primary | ICD-10-CM

## 2024-12-13 PROCEDURE — 73020 X-RAY EXAM OF SHOULDER: CPT

## 2024-12-13 PROCEDURE — 73030 X-RAY EXAM OF SHOULDER: CPT

## 2024-12-13 PROCEDURE — 6360000002 HC RX W HCPCS: Performed by: STUDENT IN AN ORGANIZED HEALTH CARE EDUCATION/TRAINING PROGRAM

## 2024-12-13 PROCEDURE — 99285 EMERGENCY DEPT VISIT HI MDM: CPT

## 2024-12-13 PROCEDURE — 6370000000 HC RX 637 (ALT 250 FOR IP): Performed by: STUDENT IN AN ORGANIZED HEALTH CARE EDUCATION/TRAINING PROGRAM

## 2024-12-13 PROCEDURE — 23650 CLTX SHO DSLC W/MNPJ WO ANES: CPT

## 2024-12-13 PROCEDURE — 96374 THER/PROPH/DIAG INJ IV PUSH: CPT

## 2024-12-13 RX ORDER — KETOROLAC TROMETHAMINE 30 MG/ML
15 INJECTION, SOLUTION INTRAMUSCULAR; INTRAVENOUS ONCE
Status: COMPLETED | OUTPATIENT
Start: 2024-12-13 | End: 2024-12-13

## 2024-12-13 RX ORDER — ACETAMINOPHEN 500 MG
1000 TABLET ORAL ONCE
Status: COMPLETED | OUTPATIENT
Start: 2024-12-13 | End: 2024-12-13

## 2024-12-13 RX ORDER — FENTANYL CITRATE 0.05 MG/ML
75 INJECTION, SOLUTION INTRAMUSCULAR; INTRAVENOUS ONCE
Status: COMPLETED | OUTPATIENT
Start: 2024-12-13 | End: 2024-12-13

## 2024-12-13 RX ADMIN — PROPOFOL 40 MG: 10 INJECTION, EMULSION INTRAVENOUS at 11:24

## 2024-12-13 RX ADMIN — KETOROLAC TROMETHAMINE 15 MG: 30 INJECTION, SOLUTION INTRAMUSCULAR at 12:16

## 2024-12-13 RX ADMIN — FENTANYL CITRATE 75 MCG: 0.05 INJECTION, SOLUTION INTRAMUSCULAR; INTRAVENOUS at 10:27

## 2024-12-13 RX ADMIN — ACETAMINOPHEN 1000 MG: 500 TABLET ORAL at 12:16

## 2024-12-13 RX ADMIN — PROPOFOL 40 MG: 10 INJECTION, EMULSION INTRAVENOUS at 11:22

## 2024-12-13 ASSESSMENT — PAIN DESCRIPTION - ORIENTATION
ORIENTATION: RIGHT
ORIENTATION: RIGHT

## 2024-12-13 ASSESSMENT — PAIN DESCRIPTION - LOCATION
LOCATION: SHOULDER
LOCATION: SHOULDER

## 2024-12-13 ASSESSMENT — PAIN - FUNCTIONAL ASSESSMENT: PAIN_FUNCTIONAL_ASSESSMENT: 0-10

## 2024-12-13 ASSESSMENT — PAIN SCALES - GENERAL
PAINLEVEL_OUTOF10: 10
PAINLEVEL_OUTOF10: 8
PAINLEVEL_OUTOF10: 10

## 2024-12-13 ASSESSMENT — ENCOUNTER SYMPTOMS
ABDOMINAL PAIN: 0
SHORTNESS OF BREATH: 0

## 2024-12-13 NOTE — DISCHARGE INSTRUCTIONS
Please follow-up with Dr. Fisher as soon as possible  Please continue take all home medication as prescribed  You are taking meloxicam which is safe to take especially for pain but you may also take Tylenol up to 1 g every 8 hours as needed for pain  If you are having any worsening of symptoms, return to the ER

## 2024-12-13 NOTE — ED NOTES
Pt laying down flat and states this is a comfortable position for her. Pt saturation decreased to 89%, Pt placed on 2LNC, Pt saturation increased to 95%. Dr. Teran notified.

## 2024-12-13 NOTE — ED TRIAGE NOTES
Mode of arrival (squad #, walk in, police, etc) : [place]        Chief complaint(s): RT shoulder pain        Arrival Note (brief scenario, treatment PTA, etc).: Pt arrives via ambulance for Rt shoulder pain. Pt had surgery x6 years ago and has been having issues with it since. Pt states hx of disloaction of shoulder. Pt was moving a blanket back and states she felt her shoulder move out of place. Pt rates pain 10/10. Pt took \"2 tylenol\" PTA. Pt arrived with arm in sling. Pt is AO x4, vitals assessed, care ongoing.

## 2024-12-13 NOTE — ED PROVIDER NOTES
[facility]  Emergency Department Encounter  Emergency Medicine Physician     Pt Name: Aster Edward  MRN: [id number]  Birthdate 1945  Date of evaluation: 12/13/24  PCP:  Kari Gibbons MD    CHIEF COMPLAINT       Chief Complaint   Patient presents with    Shoulder Pain     RT       HISTORY OF PRESENT ILLNESS  (Location/Symptom, Timing/Onset, Context/Setting, Quality, Duration, Modifying Factors, Severity.)    Aster Edward is a 79 y.o. female who presents with right shoulder pain.  Patient states that she was putting some new covers on the bed and when she sort of flung the cover she started having some immediate pain in his right shoulder and felt a pop and is concerned it is dislocated.  States that she has had dislocation of the right shoulder for 4.  States having pain to that shoulder and to the upper arm but nowhere else        PAST MEDICAL / SURGICAL / SOCIAL / FAMILY HISTORY    has a past medical history of Abnormal cardiovascular stress test, Acute gouty arthropathy, Arthritis, Body mass index 36.0-36.9, adult, CAD (coronary artery disease), Fall, Fracture, shoulder, Hypertension, Other abnormal glucose, Other specified urticaria, Unspecified asthma(493.90), Unspecified hypertensive heart disease without heart failure, Unspecified vitamin D deficiency, and Wears glasses.     has a past surgical history that includes Cardiac catheterization; Bunionectomy (Right); tumor excision; Shoulder arthroscopy (Right, 07/14/2016); Shoulder arthroscopy (Right, 07/14/2016); Hysterectomy (10/02/2018); Cardiac catheterization (02/27/2024); and Cardiac procedure (N/A, 2/27/2024).    Social History     Socioeconomic History    Marital status:      Spouse name: Not on file    Number of children: Not on file    Years of education: Not on file    Highest education level: Not on file   Occupational History    Not on file   Tobacco Use    Smoking status: Never    Smokeless tobacco: Never

## 2025-01-06 ENCOUNTER — OFFICE VISIT (OUTPATIENT)
Dept: ORTHOPEDIC SURGERY | Age: 80
End: 2025-01-06

## 2025-01-06 VITALS — WEIGHT: 176 LBS | RESPIRATION RATE: 14 BRPM | HEIGHT: 60 IN | BODY MASS INDEX: 34.55 KG/M2

## 2025-01-06 DIAGNOSIS — M75.101 ROTATOR CUFF TEAR, NON-TRAUMATIC, RIGHT: ICD-10-CM

## 2025-01-06 DIAGNOSIS — S43.016A ANTERIOR SHOULDER DISLOCATION, INITIAL ENCOUNTER: Primary | ICD-10-CM

## 2025-01-06 RX ORDER — METHYLPREDNISOLONE 4 MG/1
TABLET ORAL
Qty: 1 KIT | Refills: 0 | Status: SHIPPED | OUTPATIENT
Start: 2025-01-06 | End: 2025-01-12

## 2025-01-06 NOTE — PROGRESS NOTES
Obvious fracture or dislocation.    X-rays of the right shoulder completed on 12/13/2024 were viewed independently demonstrating an anterior glenohumeral joint dislocation with subsequent reduction.    Assessment and Plan  Aster Edward is a 79 y.o. old female with recurrent right shoulder instability.  She is dislocated 3 times in the span of the year.  As noted above he did not require much to dislocate her shoulder during the most recent incident and that was the case with her second dislocation.  I had a discussion with the patient today about this.  My concern given her age is that she has a significant rotator cuff tear that is obviously rendering her unstable.  Given the fact that she has gone through treatment conservatively with therapy I recommended proceeding at this time with further evaluation using an MRI study of her shoulder.  We will facilitate her getting the study completed and I will have her follow-up afterwards to review and discuss results proceeding with additional treatment recommendations at that time as indicated.    This note is created with the assistance of a speech recognition program.  While intending to generate adocument that actually reflects the content of the visit, the document can still have some errors including those of syntax and sound a like substitutions which may escape proof reading.  It such instances, actual meaningcan be extrapolated by contextual diversion.

## 2025-01-08 ENCOUNTER — HOSPITAL ENCOUNTER (OUTPATIENT)
Dept: MRI IMAGING | Age: 80
Discharge: HOME | End: 2025-01-10
Attending: ORTHOPAEDIC SURGERY
Payer: MEDICARE

## 2025-01-08 DIAGNOSIS — M75.101 ROTATOR CUFF TEAR, NON-TRAUMATIC, RIGHT: ICD-10-CM

## 2025-01-08 DIAGNOSIS — S43.016A ANTERIOR SHOULDER DISLOCATION, INITIAL ENCOUNTER: ICD-10-CM

## 2025-01-08 PROCEDURE — 73221 MRI JOINT UPR EXTREM W/O DYE: CPT

## 2025-01-23 ENCOUNTER — TELEPHONE (OUTPATIENT)
Dept: ORTHOPEDIC SURGERY | Age: 80
End: 2025-01-23

## 2025-01-23 NOTE — TELEPHONE ENCOUNTER
Left voicemail for patient informing her that her needs would be better suited with Dr. Fisher and consequently she should call office back to cancel 1/24 appointment with oMise Garcia and reschedule appointment with Dr. Fisher.

## 2025-01-24 ENCOUNTER — OFFICE VISIT (OUTPATIENT)
Dept: ORTHOPEDIC SURGERY | Age: 80
End: 2025-01-24
Payer: MEDICARE

## 2025-01-24 VITALS — HEIGHT: 60 IN | BODY MASS INDEX: 34.55 KG/M2 | WEIGHT: 176 LBS | RESPIRATION RATE: 14 BRPM

## 2025-01-24 DIAGNOSIS — S43.016A ANTERIOR SHOULDER DISLOCATION, INITIAL ENCOUNTER: Primary | ICD-10-CM

## 2025-01-24 DIAGNOSIS — S43.014D ANTERIOR DISLOCATION OF RIGHT SHOULDER, SUBSEQUENT ENCOUNTER: ICD-10-CM

## 2025-01-24 PROCEDURE — 1090F PRES/ABSN URINE INCON ASSESS: CPT | Performed by: ORTHOPAEDIC SURGERY

## 2025-01-24 PROCEDURE — 1125F AMNT PAIN NOTED PAIN PRSNT: CPT | Performed by: ORTHOPAEDIC SURGERY

## 2025-01-24 PROCEDURE — G8399 PT W/DXA RESULTS DOCUMENT: HCPCS | Performed by: ORTHOPAEDIC SURGERY

## 2025-01-24 PROCEDURE — 1123F ACP DISCUSS/DSCN MKR DOCD: CPT | Performed by: ORTHOPAEDIC SURGERY

## 2025-01-24 PROCEDURE — 99213 OFFICE O/P EST LOW 20 MIN: CPT | Performed by: ORTHOPAEDIC SURGERY

## 2025-01-24 PROCEDURE — G8428 CUR MEDS NOT DOCUMENT: HCPCS | Performed by: ORTHOPAEDIC SURGERY

## 2025-01-24 PROCEDURE — G8417 CALC BMI ABV UP PARAM F/U: HCPCS | Performed by: ORTHOPAEDIC SURGERY

## 2025-01-24 PROCEDURE — 1036F TOBACCO NON-USER: CPT | Performed by: ORTHOPAEDIC SURGERY

## 2025-01-24 NOTE — PROGRESS NOTES
HPI: Ms. Edward is a 79-year-old lady gaited review the results of her right shoulder MRI study which was completed on 1/8/2025.  I did review the images independently and it demonstrates a tear involving the supraspinatus and infraspinatus tendons with retraction to at least the middle of the humeral head.  Evidence of his previous rotator cuff repair noted with an anchor in the greater tuberosity.  No obvious dislocation.  Mild superior humeral head migration.  I had a discussion with the patient today educating her about MRI study and discussed treatment options available to her including continued attempts at conservative management and surgical intervention.  Given the several instability episodes she has had with simple daily activities surgery certainly reasonable option for her by way of a revision rotator cuff repair with graft augmentation.  We also discussed the possibility of a reverse shoulder arthroplasty but it is not clear that this would be a good indication at this time.  Following discussions today she would like to continue to manage this conservatively and so prescription for physical therapy was provided.  I will see her back in my clinic as needed but she may certainly return or call at anytime with persistent or worsening symptoms and with any questions or concerns.

## 2025-01-30 ENCOUNTER — HOSPITAL ENCOUNTER (OUTPATIENT)
Dept: PHYSICAL THERAPY | Age: 80
Setting detail: THERAPIES SERIES
Discharge: HOME | End: 2025-01-30
Attending: ORTHOPAEDIC SURGERY
Payer: MEDICARE

## 2025-01-30 PROCEDURE — 97162 PT EVAL MOD COMPLEX 30 MIN: CPT

## 2025-01-30 NOTE — THERAPY EVALUATION
[x] [facility]   Outpatient Rehabilitation & Therapy  [address]  P: [phone]   F: [phone]     Physical Therapy Upper Extremity Evaluation    Date:  2025  Patient: Aster Edward  : 1945  MRN: [id number]  Physician:  Melissa Fisher MD   Insurance: [employer] medicare - AUTH AFTER EVAL   Medical Diagnosis: S43.014D (ICD-10-CM) - Anterior dislocation of right shoulder, subsequent encounter    Rehab Codes: R25 pain , M25.60 stiffness, R53.1 weakness   Onset Date: 24    Next 's appt: TBD     Precautions: avoid \"cocked position for throwing\"  See Dr. Fisher's Anterior Shoulder Dislocation/Subluxation Rehab guidelines -- beginning phase I (in soft chart)     Subjective:   CC: Recurrent R shoulder anterior dislocation      Pt initially went to ED due to R shoulder pain that occurred with putting some new covers on the bed and when she sort of flung the cover she started having some immediate pain in his right shoulder. She was found to have R anterior shoulder dislocation which was relocated in the ED. She has had 2 previous anterior shoulder dislocations. MRI of her shoulder was completed which shows tears of the RTC. She has had RTC repair > 5 years.  She opted to proceed conservatively with PT. She is wearing a sling at night to protect the arm due to previous dislocation when sleeping.     She notes she has most pain in the R arm and into the forearm that gets worse with activity. Rates pain at 4/10 avg but can get up to 10/10 with reaching behind or externally rotating the arm to the side. Even feels pain with meal prepping.     PMHx: [] Unremarkable [] Diabetes [x] HTN  [] Pacemaker   [x] MI/Heart Problems [] Cancer [x] Arthritis [] Other:              [x] Refer to full medical chart  In EPIC   Past Medical History:   Diagnosis Date    Abnormal cardiovascular stress test 2024    Acute gouty arthropathy     Arthritis     Body mass index 36.0-36.9, adult     CAD

## 2025-02-05 NOTE — FLOWSHEET NOTE
[facility]   Outpatient Rehabilitation & Therapy  [address]  P: [phone]   F: [phone]    Physical Therapy Daily Treatment Note      Date:  2025  Patient Name:  Aster Edward    :  1945  MRN: [id number]  Physician:      Melissa Fisher MD                                    Insurance: [employer] medicare - 18 visits 25-25  Medical Diagnosis: S43.014D (ICD-10-CM) - Anterior dislocation of right shoulder, subsequent encounter             Rehab Codes: R25 pain , M25.60 stiffness, R53.1 weakness   Onset Date: 24                        Next 's appt: TBD  Visit# / total visits:   Cancels/No Shows: 0/0    Precautions: avoid \"cocked position for throwing\"  See Dr. Fisher's Anterior Shoulder Dislocation/Subluxation Rehab guidelines -- beginning phase I (in soft chart)     Subjective:  Patient denying pain upon arriving to therapy but states her pain can increase with certain movements.    Pain:  [] Yes  [x] No Location:  N/A Pain Rating: (0-10 scale) denies/10; 3-4/10 with certain movements  Pain altered Tx:  [] No  [] Yes  Action:  Comments:    Objective:  Modalities:   Precautions:  Exercises:  INTERVENTIONS  Reps/ Time Weight/ Level Completed  Today Comments               MODALITIES                                    MANUAL                                    EXERCISES            Supine       AAROM Flexion 10x 3\"   x     AAROM Chest Press 10x2   x     Supine Protraction 10x2   x  TC needed for proper execution   Shrugs/depression 10x 3\"  x Caused discomfort                 Seated       Scapular retraction 10x2 5\" x    Bicep Curls 10x 1lb x    Pronation/supination 15x 1lb x Inc pain noted but dissipated with rest     Standing       Shoulder IR  10x 5\"  x Soreness noted; able to tolerate to glut      Patient Education/Home Program:   --      Specific Instructions for next treatment:   - begin per anterior shoulder guidelines   - AAROM in pain free range  - scapular

## 2025-02-06 ENCOUNTER — HOSPITAL ENCOUNTER (OUTPATIENT)
Dept: PHYSICAL THERAPY | Age: 80
Setting detail: THERAPIES SERIES
Discharge: HOME | End: 2025-02-06
Attending: ORTHOPAEDIC SURGERY
Payer: MEDICARE

## 2025-02-06 PROCEDURE — 97110 THERAPEUTIC EXERCISES: CPT

## 2025-02-07 ENCOUNTER — HOSPITAL ENCOUNTER (OUTPATIENT)
Dept: PHYSICAL THERAPY | Age: 80
Setting detail: THERAPIES SERIES
Discharge: HOME | End: 2025-02-07
Attending: ORTHOPAEDIC SURGERY
Payer: MEDICARE

## 2025-02-07 PROCEDURE — 97110 THERAPEUTIC EXERCISES: CPT

## 2025-02-07 NOTE — FLOWSHEET NOTE
[facility]   Outpatient Rehabilitation & Therapy  [address] [facility] [address]  P: [phone]   F: [phone]    Physical Therapy Daily Treatment Note      Date:  2025  Patient Name:  Aster Edward    :  1945  MRN: [id number]  Physician:      Melissa Fisher MD                                    Insurance: [employer] medicare - 18 visits 25-25  Medical Diagnosis: S43.014D (ICD-10-CM) - Anterior dislocation of right shoulder, subsequent encounter             Rehab Codes: R25 pain , M25.60 stiffness, R53.1 weakness   Onset Date: 24                        Next 's appt: TBD  Visit# / total visits: 3/18  Cancels/No Shows: 0/0    Precautions: avoid \"cocked position for throwing\"  See Dr. Fisher's Anterior Shoulder Dislocation/Subluxation Rehab guidelines -- beginning phase I (in soft chart)     Subjective:  Patient reporting her shoulder was sore post last session.  Patient denies pain upon arrival but states she is sore.  Patient reporting soreness yesterday due to initial session post .    Pain:  [] Yes  [x] No Location:  N/A Pain Rating: (0-10 scale) denies/10; 3-4/10 with certain movements  Pain altered Tx:  [] No  [] Yes  Action:  Comments:    Objective:  Modalities:   Precautions:  Exercises:  INTERVENTIONS  Reps/ Time Weight/ Level Completed  Today Comments               MODALITIES                                    MANUAL                                    EXERCISES            Supine       Shoulder PROM 10x 5\"  x 2/7 added ABD and flexion   AAROM Flexion  10x 3\"   x     AAROM Chest Press 10x2   x     Supine Protraction 10x2   x  2/7 TC needed for proper execution   AROM shoulder flexion 10x  x 2/7 added          Seated       Scapular retraction 10x2 5\" x    Bicep Curls 10x 1lb x    Pronation/supination 15x 1lb x 2/7 no pain noted this session     Shrugs 10x  x 2/7 progressed to seated   Standing       Shoulder IR  10x 5\"  x Soreness noted; able to tolerate to glut

## 2025-02-11 ENCOUNTER — HOSPITAL ENCOUNTER (OUTPATIENT)
Dept: PHYSICAL THERAPY | Age: 80
Setting detail: THERAPIES SERIES
Discharge: HOME | End: 2025-02-11
Attending: ORTHOPAEDIC SURGERY
Payer: MEDICARE

## 2025-02-11 PROCEDURE — 97110 THERAPEUTIC EXERCISES: CPT

## 2025-02-11 PROCEDURE — 97112 NEUROMUSCULAR REEDUCATION: CPT

## 2025-02-11 NOTE — FLOWSHEET NOTE
[facility]   Outpatient Rehabilitation & Therapy  [address]  P: [phone]   F: [phone]    Physical Therapy Daily Treatment Note      Date:  2025  Patient Name:  Aster Edward    :  1945  MRN: [id number]  Physician:      Melissa Fisher MD                                    Insurance: [employer] medicare - 18 visits 25-25  Medical Diagnosis: S43.014D (ICD-10-CM) - Anterior dislocation of right shoulder, subsequent encounter             Rehab Codes: R25 pain , M25.60 stiffness, R53.1 weakness   Onset Date: 24                        Next 's appt: TBD  Visit# / total visits:   Cancels/No Shows: 0/0    Precautions: avoid \"cocked position for throwing\"  See Dr. Fisher's Anterior Shoulder Dislocation/Subluxation Rehab guidelines -- beginning phase I (in soft chart)     Subjective:  Patient reporting she was sore after last session.   Patient denies pain upon arrival but states she is sore.  The highest it goes is 4/10. She notes she is gaining ROM.       Pain:  [] Yes  [x] No Location:  N/A Pain Rating: (0-10 scale) denies/10;   Pain altered Tx:  [] No  [] Yes  Action:  Comments:    Objective:  Modalities:   Precautions:  Exercises:  INTERVENTIONS  Reps/ Time Weight/ Level Completed  Today Comments               MODALITIES                                    MANUAL                                    EXERCISES            Supine       Shoulder PROM 10x 5\"  x  ABD and flexion   AAROM Flexion  10x 3\"   x     AAROM Chest Press 10x2   x     Posterior shoulder stretch  2x30s   x    Supine Protraction 10x2   x  TC needed for proper execution   Rhythmic stabilization: IR/ER at side  2x1 min   x    Rhythmic stabilization at 90 deg flex  2x1 min   x    AROM shoulder flexion 10x             Seated       Shoulder flexion  10x2 1#  x Only to 90 until control built    Full can  10x2 1#  x Therapist guiding motion    Scapular retraction 10x2 5\"     Bicep Curls 10x2 1lb x

## 2025-02-14 ENCOUNTER — HOSPITAL ENCOUNTER (OUTPATIENT)
Dept: PHYSICAL THERAPY | Age: 80
Setting detail: THERAPIES SERIES
Discharge: HOME | End: 2025-02-14
Attending: ORTHOPAEDIC SURGERY
Payer: MEDICARE

## 2025-02-14 PROCEDURE — 97110 THERAPEUTIC EXERCISES: CPT

## 2025-02-14 PROCEDURE — 97112 NEUROMUSCULAR REEDUCATION: CPT

## 2025-02-14 NOTE — FLOWSHEET NOTE
Manual Therapy      []  Ther Activities      []  Aquatics      [x]  Neuromuscular 8 1    [] Vasocompression      [] Gait Training      [] Dry needling        [] 1 or 2 muscles        [] 3 or more muscles      []  Other      Total Billable time 43 3      Time In: 1500          Time Out:  1146    Electronically signed by:  Estefani Shaw PTA

## 2025-02-18 ENCOUNTER — HOSPITAL ENCOUNTER (OUTPATIENT)
Dept: PHYSICAL THERAPY | Age: 80
Setting detail: THERAPIES SERIES
Discharge: HOME | End: 2025-02-18
Attending: ORTHOPAEDIC SURGERY
Payer: MEDICARE

## 2025-02-18 PROCEDURE — 97112 NEUROMUSCULAR REEDUCATION: CPT

## 2025-02-18 PROCEDURE — 97110 THERAPEUTIC EXERCISES: CPT

## 2025-02-18 NOTE — FLOWSHEET NOTE
[facility]   Outpatient Rehabilitation & Therapy  [address] [facility] [address]  P: [phone]   F: [phone]    Physical Therapy Daily Treatment Note      Date:  2025  Patient Name:  Aster Edward    :  1945  MRN: [id number]  Physician:      Melissa Fisher MD                                    Insurance: [employer] medicare - 18 visits 25-25  Medical Diagnosis: S43.014D (ICD-10-CM) - Anterior dislocation of right shoulder, subsequent encounter             Rehab Codes: R25 pain , M25.60 stiffness, R53.1 weakness   Onset Date: 24                        Next 's appt: TBD  Visit# / total visits:   Cancels/No Shows: 0/0    Precautions: avoid \"cocked position for throwing\"  See Dr. Fisher's Anterior Shoulder Dislocation/Subluxation Rehab guidelines -- beginning phase I (in soft chart)     Subjective:  Patient reporting reaching behind her back has gotten easier and demos reaching to low back.  Patient reporting cutting food for prep has gotten easier and so has washing her hair.  Patient reporting she has not been compliant with HEP at home due to being too busy.      Pain:  [] Yes  [x] No Location:  N/A Pain Rating: (0-10 scale) denies/10;   Pain altered Tx:  [] No  [] Yes  Action:  Comments:    Objective:  Modalities:   Precautions:  Exercises:  INTERVENTIONS  Reps/ Time Weight/ Level Completed  Today Comments               MODALITIES                                    MANUAL                                    EXERCISES            Supine       Shoulder PROM 10x 5\"    ABD and flexion   AAROM Flexion  10x2 3\"  1# x 2 added weight  218 monitor as the patient tends to elevate UT-- inc sets for proper technique   AAROM Chest Press 10x2  1# x  2/14 added weight   Supine Protraction 10x2  1# x  218 TC needed for proper execution   Rhythmic stabilization: IR/ER at side  2x1 min   x    Rhythmic stabilization at 90 deg flex  2x1 min   x 2/18 patient reporting difficulty with this ex

## 2025-02-20 ENCOUNTER — HOSPITAL ENCOUNTER (OUTPATIENT)
Dept: PHYSICAL THERAPY | Age: 80
Setting detail: THERAPIES SERIES
Discharge: HOME | End: 2025-02-20
Attending: ORTHOPAEDIC SURGERY
Payer: MEDICARE

## 2025-02-20 PROCEDURE — 97112 NEUROMUSCULAR REEDUCATION: CPT

## 2025-02-20 PROCEDURE — 97110 THERAPEUTIC EXERCISES: CPT

## 2025-02-20 NOTE — FLOWSHEET NOTE
[facility]   Outpatient Rehabilitation & Therapy  [address] [facility] [address]  P: [phone]   F: [phone]    Physical Therapy Daily Treatment Note      Date:  2025  Patient Name:  Aster Edward    :  1945  MRN: [id number]  Physician:      Melissa Fisher MD                                    Insurance: [employer] medicare - 18 visits 25-25  Medical Diagnosis: S43.014D (ICD-10-CM) - Anterior dislocation of right shoulder, subsequent encounter             Rehab Codes: R25 pain , M25.60 stiffness, R53.1 weakness   Onset Date: 24                        Next 's appt: TBD  Visit# / total visits:   Cancels/No Shows: 0/0    Precautions: avoid \"cocked position for throwing\"  See Dr. Fisher's Anterior Shoulder Dislocation/Subluxation Rehab guidelines -- beginning phase I (in soft chart)     Subjective:  Pain noted in the cubital fossa and states the pain is on and off.  Patient reporting she lost her HEP and needs a new one.  Patient also reporting forearm tightness.  Patient reporting she is unsure of what could be causing her inc \"pain\".    Pain:  [x] Yes  [] No Location:  Cubital fossa of R UE Pain Rating: (0-10 scale) 4/10;   Pain altered Tx:  [] No  [] Yes  Action:  Comments:    Objective:  Modalities:   Precautions:  Exercises:  INTERVENTIONS  Reps/ Time Weight/ Level Completed  Today Comments               MODALITIES                                    MANUAL                                    EXERCISES            Supine       Shoulder PROM 10x 5\"    ABD and flexion   AAROM Flexion  10x2 3\"  1# x 2/14 added weight  18 monitor as the patient tends to elevate UT-- inc sets for proper technique   AAROM Chest Press 15x2  1# x 2/20 inc reps   Supine Protraction 15x2  1# x  2/20 TC needed for proper execution; inc reps   Rhythmic stabilization: IR/ER at side  2x1 min   x    Rhythmic stabilization at 90 deg flex  2x1 min   x    AROM shoulder flexion 10x             Seated

## 2025-02-25 ENCOUNTER — HOSPITAL ENCOUNTER (OUTPATIENT)
Dept: PHYSICAL THERAPY | Age: 80
Setting detail: THERAPIES SERIES
Discharge: HOME | End: 2025-02-25
Attending: ORTHOPAEDIC SURGERY
Payer: MEDICARE

## 2025-02-25 PROCEDURE — 97112 NEUROMUSCULAR REEDUCATION: CPT

## 2025-02-25 PROCEDURE — 97110 THERAPEUTIC EXERCISES: CPT

## 2025-02-25 NOTE — FLOWSHEET NOTE
[facility]   Outpatient Rehabilitation & Therapy  [address] [facility] [address]  P: [phone]   F: [phone]    Physical Therapy Daily Treatment Note      Date:  2025  Patient Name:  Aster Edward    :  1945  MRN: [id number]  Physician:      Melissa Fisher MD                                    Insurance: [employer] medicare - 18 visits 25-25  Medical Diagnosis: S43.014D (ICD-10-CM) - Anterior dislocation of right shoulder, subsequent encounter             Rehab Codes: R25 pain , M25.60 stiffness, R53.1 weakness   Onset Date: 24                        Next 's appt: TBD  Visit# / total visits:   Cancels/No Shows: 0/0    Precautions: avoid \"cocked position for throwing\"  See Dr. Fisher's Anterior Shoulder Dislocation/Subluxation Rehab guidelines -- beginning phase I (in soft chart)     Subjective:  Pain noted in the cubital fossa and states the pain is on and off.  Patient reporting she lost her HEP and needs a new one.  Patient also reporting forearm tightness.  Patient reporting she is unsure of what could be causing her inc \"pain\".    Pain:  [x] Yes  [] No Location:  Cubital fossa of R UE Pain Rating: (0-10 scale) 4/10;   Pain altered Tx:  [] No  [] Yes  Action:  Comments:    Objective:  Modalities:   Precautions:  Exercises:  INTERVENTIONS  Reps/ Time Weight/ Level Completed  Today Comments               MODALITIES                                    MANUAL                                    EXERCISES            Supine       Shoulder PROM 10x 5\"    ABD and flexion   AAROM Flexion  10x2 3\"  1#  2 added weight   monitor as the patient tends to elevate UT-- inc sets for proper technique   AAROM Chest Press 15x2  1#  2/20 inc reps   Supine Protraction 15x2  1#   2/20 TC needed for proper execution; inc reps   Rhythmic stabilization: IR/ER at side  2x1 min       Rhythmic stabilization at 90 deg flex  2x1 min       AROM shoulder flexion 10x             Seated       Shoulder

## 2025-02-27 ENCOUNTER — HOSPITAL ENCOUNTER (OUTPATIENT)
Dept: PHYSICAL THERAPY | Age: 80
Setting detail: THERAPIES SERIES
Discharge: HOME | End: 2025-02-27
Attending: ORTHOPAEDIC SURGERY
Payer: MEDICARE

## 2025-02-27 PROCEDURE — 97110 THERAPEUTIC EXERCISES: CPT

## 2025-02-27 PROCEDURE — 97112 NEUROMUSCULAR REEDUCATION: CPT

## 2025-02-27 NOTE — FLOWSHEET NOTE
[facility]   Outpatient Rehabilitation & Therapy  [address] [facility] [address]  P: [phone]   F: [phone]    Physical Therapy Daily Treatment Note/Progress Note       Date:  2025  Patient Name:  Aster Edward    :  1945  MRN: [id number]  Physician:      Melissa Fisher MD                                    Insurance: [employer] medicare - 18 visits 25-25  Medical Diagnosis: S43.014D (ICD-10-CM) - Anterior dislocation of right shoulder, subsequent encounter             Rehab Codes: R25 pain , M25.60 stiffness, R53.1 weakness   Onset Date: 24                        Next 's appt: TBD -- 25- Dr. Noonan   Visit# / total visits:   Cancels/No Shows: 0/0  Date of initial visit: 25        Date of PN: 25 (visit 9)  Formal progress note reporting period:  25 - 25     Precautions: avoid \"cocked position for throwing\"  See Dr. Fisher's Anterior Shoulder Dislocation/Subluxation Rehab guidelines -- beginning phase I (in soft chart)     Subjective:   She states she is sore in the R shoulder. She still feels instability with lifting the arm and twisting. Feels there is improvement since starting PT.  Feels it is difficult for her to  heavy objects -- will not  a bag groceries. She feels housework is difficult. She feels soreness and pain with laundry and feels she can not vacuum. She notes she is getting a 2nd opinion later in April     Pain:  [] Yes  [x] No Location:  denies pain  Pain Rating: (0-10 scale) denies/10;   Pain altered Tx:  [] No  [] Yes  Action:  Comments:    Objective:  Modalities:   Precautions:  Exercises:  INTERVENTIONS  Reps/ Time Weight/ Level Completed  Today Comments               MODALITIES                                    MANUAL                                    EXERCISES            Supine       Shoulder PROM 10x 5\"    ABD and flexion   AAROM Flexion  10x2 3\"  1#   added weight   monitor as the patient tends to

## 2025-03-04 ENCOUNTER — HOSPITAL ENCOUNTER (OUTPATIENT)
Dept: PHYSICAL THERAPY | Age: 80
Setting detail: THERAPIES SERIES
Discharge: HOME | End: 2025-03-04
Attending: ORTHOPAEDIC SURGERY
Payer: MEDICARE

## 2025-03-04 PROCEDURE — 97110 THERAPEUTIC EXERCISES: CPT

## 2025-03-04 PROCEDURE — 97112 NEUROMUSCULAR REEDUCATION: CPT

## 2025-03-04 NOTE — FLOWSHEET NOTE
[facility]   Outpatient Rehabilitation & Therapy  [address]  P: [phone]   F: [phone]    Physical Therapy Daily Treatment Note    Date:  3/4/2025  Patient Name:  Aster Edward    :  1945  MRN: [id number]  Physician:      Melissa Fisher MD                                    Insurance: [employer] medicare - 18 visits 25-25  Medical Diagnosis: S43.014D (ICD-10-CM) - Anterior dislocation of right shoulder, subsequent encounter             Rehab Codes: R25 pain , M25.60 stiffness, R53.1 weakness   Onset Date: 24                        Next 's appt: TBD -- 25- Dr. Noonan   Visit# / total visits: 10/18  Cancels/No Shows: 0/0  Date of initial visit: 25        Date of PN: 25 (visit 9)      Precautions: avoid \"cocked position for throwing\"  See Dr. Fisher's Anterior Shoulder Dislocation/Subluxation Rehab guidelines -- beginning phase I (in soft chart)     Subjective:   She states  she is doing more at home and feels it have been going well at home.  She note she is getting tired and tense mostly when feeding herself. She notes what caused pain the most was mopping the floor -- able to do it once with no pain, the 2nd time was 5/10 pain in the R bicep. She denies any adverse events after last session     Pain:  [] Yes  [x] No Location:  denies pain  Pain Rating: (0-10 scale) denies/10;   Pain altered Tx:  [] No  [] Yes  Action:  Comments:    Objective:  Modalities:   Precautions:  Exercises:  INTERVENTIONS  Reps/ Time Weight/ Level Completed  Today Comments               MODALITIES                                    MANUAL                                    EXERCISES            Supine       Shoulder PROM 10x 5\"    ABD and flexion   AAROM Flexion  10x2 3\"  1#  2/ added weight  18 monitor as the patient tends to elevate UT-- inc sets for proper technique   AAROM Chest Press 15x2  1#  2/20 inc reps   Supine Protraction 15x2  1#   2/20 TC needed for proper

## 2025-03-06 ENCOUNTER — HOSPITAL ENCOUNTER (OUTPATIENT)
Dept: PHYSICAL THERAPY | Age: 80
Setting detail: THERAPIES SERIES
Discharge: HOME | End: 2025-03-06
Attending: ORTHOPAEDIC SURGERY
Payer: MEDICARE

## 2025-03-06 PROCEDURE — 97110 THERAPEUTIC EXERCISES: CPT

## 2025-03-06 PROCEDURE — 97112 NEUROMUSCULAR REEDUCATION: CPT

## 2025-03-06 NOTE — FLOWSHEET NOTE
[facility]   Outpatient Rehabilitation & Therapy  [address] [address]  P: [phone]   F: [phone]    Physical Therapy Daily Treatment Note    Date:  3/6/2025  Patient Name:  Aster Edward    :  1945  MRN: [id number]  Physician:      Melissa Fisher MD                                    Insurance: [employer] medicare - 18 visits 25-25  Medical Diagnosis: S43.014D (ICD-10-CM) - Anterior dislocation of right shoulder, subsequent encounter             Rehab Codes: R25 pain , M25.60 stiffness, R53.1 weakness   Onset Date: 24                        Next 's appt: TBD -- 25- Dr. Noonan   Visit# / total visits:   Cancels/No Shows: 0/0    Precautions: avoid \"cocked position for throwing\"  See Dr. Fisher's Anterior Shoulder Dislocation/Subluxation Rehab guidelines -- beginning phase I (in soft chart)     Subjective:   Patient reporting she still has intermittent pain that is keeping her from returning to normal daily routine tasks consistently.      Pain:  [] Yes  [x] No Location:  denies pain  Pain Rating: (0-10 scale) denies/10;   Pain altered Tx:  [] No  [] Yes  Action:  Comments:    Objective:  Modalities:   Precautions:  Exercises:  INTERVENTIONS  Reps/ Time Weight/ Level Completed  Today Comments               MODALITIES                                    MANUAL                                    EXERCISES            Supine       Shoulder PROM 10x 5\"    ABD and flexion   AAROM Flexion  10x2 3\"  1#  2/14 added weight  2/18 monitor as the patient tends to elevate UT-- inc sets for proper technique   AAROM Chest Press 15x2  1#  2/20 inc reps   Supine Protraction 15x2  1#   2/20 TC needed for proper execution; inc reps   Rhythmic stabilization: IR/ER at side  2x1 min       Rhythmic stabilization at 90 deg flex  2x1 min       AROM shoulder flexion 10x             Seated       UBE: F/B  2.5 min ea  Lvl  2.5 x Increase resistance for strength    Shoulder flexion  10x2 2#  x

## 2025-03-11 ENCOUNTER — HOSPITAL ENCOUNTER (OUTPATIENT)
Dept: PHYSICAL THERAPY | Age: 80
Setting detail: THERAPIES SERIES
Discharge: HOME | End: 2025-03-11
Attending: ORTHOPAEDIC SURGERY
Payer: MEDICARE

## 2025-03-11 PROCEDURE — 97112 NEUROMUSCULAR REEDUCATION: CPT

## 2025-03-11 PROCEDURE — 97110 THERAPEUTIC EXERCISES: CPT

## 2025-03-11 NOTE — FLOWSHEET NOTE
Increase resistance for strength    Shoulder flexion  10x2 2#  x Only to 90 until control built  3/11 continues to demo decreased control-- used mirror for visual feedback     Full can  10x2 2#  x  guidance for proper execution  3/11 TC provided and mirror for feedback on UT compensation   Scapular retraction 10x2 5\"     Bicep Curls 10x2 2lb x 3/11 cues to work full ROM   Rhythmic stabilization with arm 90 deg flex  2x 1 min   x    Rhythmic stabilization IR/ER at 35 deg ABD  2x1 min   x    Pronation/supination 20x 1lb- pronator @ O  2/20 needs monitored for proper execution. Cues needed to encourage slow controlled movement.   B ER pull aparts  2x15  Yellow   2/20 Guiding R arm to keep form     R resisted IR  2x15  Yellow   Therapist helping to keep elbow at side    B horizontal ABD 2x 10 red x 3/11 with mirror and TC 10x without TB 10x with    Shoulder ABD  2x10 1# x Tried 2# (2/27) but too difficult -  3/11  therapist needing to guide motion to keep in ABD plane             Standing       Altn shoulder taps   20x  wall x Using cliff at feet to make sure back far enough that most weight in arms   Wall push ups  15x2   x Using cliff at feet to make sure back far enough that most weight in arms    Wall surrenders  15x2    Therapist guiding for correct form    Shoulder IR  10x2 5\"   Soreness noted;   2/14 to low back this session and inc sets   Shoulder ABD 2x10  1#   To tolerance  2/18 continues to note pain   Shrugs  2x10  4lb  x 3/11 inc weight   High pull  2x10  3#      Posterior capsule stretch 3x 30\"   2/7 added   Bend over row  2x10  3#  3/4 inc weight   Bent over tricep kick back   2x10  3#  Therapist supporting upper arm to maintain position as pt actively extends elbow   W ball rolls  8x ea  4# ball      TG horizontal ABD 10x2   2/25 added   TG rows 10x2   2/25 added   Pull downs 10x2 green x 3/11 cues on tehcnique-- yellow and red too easy progressed to green   Tricep press 10x2 green x 3/11 cues on tehcnique--

## 2025-03-13 ENCOUNTER — HOSPITAL ENCOUNTER (OUTPATIENT)
Dept: PHYSICAL THERAPY | Age: 80
Setting detail: THERAPIES SERIES
Discharge: HOME | End: 2025-03-13
Attending: ORTHOPAEDIC SURGERY
Payer: MEDICARE

## 2025-03-13 PROCEDURE — 97110 THERAPEUTIC EXERCISES: CPT

## 2025-03-13 PROCEDURE — 97112 NEUROMUSCULAR REEDUCATION: CPT

## 2025-03-13 NOTE — FLOWSHEET NOTE
[facility]   Outpatient Rehabilitation & Therapy  [address] [facility] [address]  P: [phone]   F: [phone]    Physical Therapy Daily Treatment Note    Date:  3/13/2025  Patient Name:  Aster Edward    :  1945  MRN: [id number]  Physician:      Melissa Fisher MD                                    Insurance: [employer] medicare - 18 visits 25-25  Medical Diagnosis: S43.014D (ICD-10-CM) - Anterior dislocation of right shoulder, subsequent encounter             Rehab Codes: R25 pain , M25.60 stiffness, R53.1 weakness   Onset Date: 24                        Next 's appt: TBD -- 25- Dr. Noonan   Visit# / total visits:   Cancels/No Shows: 0/0    Precautions: avoid \"cocked position for throwing\"  See Dr. Fisher's Anterior Shoulder Dislocation/Subluxation Rehab guidelines -- beginning phase I (in soft chart)     Subjective:   Patient reporting shoulder shrugs and depressions caused inc pain last session.  Patient reporting she has noticed more pain in the shoulder and upper arm since her last visit.  Patient reporting her pain was ~4-5/10 but denies pain upon arriving to therapy this morning.      Pain:  [] Yes  [x] No Location:  denies pain  Pain Rating: (0-10 scale) denies/10;   Pain altered Tx:  [] No  [] Yes  Action:  Comments:    Objective:  Modalities:   Precautions:  Exercises:  INTERVENTIONS  Reps/ Time Weight/ Level Completed  Today Comments               MODALITIES                                    MANUAL                                    EXERCISES            Supine       Shoulder PROM 10x 5\"    ABD and flexion   AAROM Flexion  10x2 3\"  1#  2/14 added weight  218 monitor as the patient tends to elevate UT-- inc sets for proper technique   AAROM Chest Press 15x2  1#  2/20 inc reps   Supine Protraction 15x2  1#   2/20 TC needed for proper execution; inc reps   Rhythmic stabilization: IR/ER at side  2x1 min       Rhythmic stabilization at 90 deg flex  2x1 min       AROM

## 2025-03-18 ENCOUNTER — HOSPITAL ENCOUNTER (OUTPATIENT)
Dept: PHYSICAL THERAPY | Age: 80
Setting detail: THERAPIES SERIES
Discharge: HOME | End: 2025-03-18
Attending: ORTHOPAEDIC SURGERY
Payer: MEDICARE

## 2025-03-18 PROCEDURE — 97110 THERAPEUTIC EXERCISES: CPT

## 2025-03-18 NOTE — FLOWSHEET NOTE
control built  3/13 pt demos control until shoulder height     Full can  10x2 2#  x  guidance for proper execution     Scapular retraction 10x2 5\"     Bicep Curls 10x2 2lb x 3/11 cues to work full ROM   Rhythmic stabilization with arm 90 deg flex  2x 1 min       Rhythmic stabilization IR/ER at 35 deg ABD  2x1 min       Pronation/supination 20x 1lb- pronator @ O  2/20 needs monitored for proper execution. Cues needed to encourage slow controlled movement.   B ER pull aparts  2x15  Yellow   2/20 Guiding R arm to keep form     R resisted IR  2x15  Yellow   Therapist helping to keep elbow at side    B horizontal ABD 2x 10 red x 3/18 cues needed for proper positioning   Shoulder ABD  2x10 1# x Tried 2# (2/27) but too difficult -  3/18  therapist needing to guide motion to keep in ABD plane             Standing       Altn shoulder taps   20x  wall x Using cliff at feet to make sure back far enough that most weight in arms   Wall push ups  10x2   x Using cliff at feet to make sure back far enough that most weight in arms   3/18 tried to perform at table but pt has difficulty maintaining WB over her BUE.     Wall surrenders  15x2    Therapist guiding for correct form    Shoulder IR  10x2 5\"   Soreness noted;   2/14 to low back this session and inc sets   Shoulder ABD 2x10  1#   To tolerance  2/18 continues to note pain   Shrugs  2x10  4lb   3/13 held this session due to inc pain post last session   High pull  2x10  3#      Posterior capsule stretch 3x 30\"   2/7 added   Bend over row  2x10  3#  3/4 inc weight   Bent over tricep kick back   2x10  3#  Therapist supporting upper arm to maintain position as pt actively extends elbow   W ball rolls  10x ea  4# ball  x    Ball on wall CW, CCW, up/down, ML 15x ea 2# x 3/13 added; patient reports feeling inc fatigue after this ex   TG horizontal ABD 10x2   2/25 added   TG rows 10x2   2/25 added   Pull downs 10x2 red x 3/18 Regress to red next session due to inc soreness noted post last

## 2025-03-20 ENCOUNTER — HOSPITAL ENCOUNTER (OUTPATIENT)
Dept: PHYSICAL THERAPY | Age: 80
Setting detail: THERAPIES SERIES
Discharge: HOME | End: 2025-03-20
Attending: ORTHOPAEDIC SURGERY
Payer: MEDICARE

## 2025-03-20 PROCEDURE — 97110 THERAPEUTIC EXERCISES: CPT

## 2025-03-20 NOTE — FLOWSHEET NOTE
Pt arrives ambulatory to triage, pt has indwelling cath since December- had cystoscopy, noted polyp in bladder and enlarged prostate. Had blood work drawn on the 21st for preop to get polyp removed, kidney levels are now elevated. A&O x4. Seated Single Arm Shoulder Full Can with Resistance  - 1 x daily - 7 x weekly - 3 sets - 10 reps  - Elbow Flexion Supinated  - 1 x daily - 7 x weekly - 3 sets - 10 reps    Comprehension of Education:  [x] Verbalizes understanding.  [] Demonstrates understanding.  [x] Needs review.  [] Demonstrates/verbalizes HEP/Ed previously given.     Plan: [x] Continue per plan of care.   [] Other:          Treatment Charges: Mins Units Time in/Out   []  Modalities      [x]  Ther Exercise 40 3    []  Manual Therapy      []  Ther Activities      []  Aquatics      []  Neuromuscular      [] Vasocompression      [] Gait Training      [] Dry needling        [] 1 or 2 muscles        [] 3 or more muscles      []  Other      Total Billable time 40 3      Time In:  0946      Time Out:  1026     Electronically signed by:  Red Snyder PT

## 2025-03-25 ENCOUNTER — HOSPITAL ENCOUNTER (OUTPATIENT)
Dept: PHYSICAL THERAPY | Age: 80
Setting detail: THERAPIES SERIES
Discharge: HOME | End: 2025-03-25
Attending: ORTHOPAEDIC SURGERY
Payer: MEDICARE

## 2025-03-25 PROCEDURE — 97110 THERAPEUTIC EXERCISES: CPT

## 2025-03-25 NOTE — FLOWSHEET NOTE
[facility]   Outpatient Rehabilitation & Therapy  [address]  P: [phone]   F: [phone]    Physical Therapy Daily Treatment Note    Date:  3/25/2025  Patient Name:  Aster Edward    :  1945  MRN: [id number]  Physician:      Melissa Fisher MD                                    Insurance: [employer] medicare - 18 visits 25-25  Medical Diagnosis: S43.014D (ICD-10-CM) - Anterior dislocation of right shoulder, subsequent encounter             Rehab Codes: R25 pain , M25.60 stiffness, R53.1 weakness   Onset Date: 24                        Next 's appt: TBD -- 25- Dr. Noonan   Visit# / total visits:   Cancels/No Shows: 0/0    Precautions: avoid \"cocked position for throwing\"  See Dr. Fisher's Anterior Shoulder Dislocation/Subluxation Rehab guidelines -- beginning phase I (in soft chart)     Subjective:   Patient reporting Saturday was a bad day stating she went to pull a pan out of the oven and the pan over supinated her arm and fell out of her hand.  No physical injury noted but states she felt the pain in the upper arm.  Patient denies having pain this date.      Pain:  [] Yes  [x] No Location:  denies pain  Pain Rating: (0-10 scale) 0/10;   Pain altered Tx:  [] No  [] Yes  Action:  Comments:    Objective:  Modalities:   Precautions:  Exercises:  INTERVENTIONS  Reps/ Time Weight/ Level Completed  Today Comments               MODALITIES                                    MANUAL                                    EXERCISES            Supine       Shoulder PROM 10x 5\"    ABD and flexion   AAROM Flexion  10x2 3\"  1#  2/ added weight  218 monitor as the patient tends to elevate UT-- inc sets for proper technique   AAROM Chest Press 15x2  1#  2/20 inc reps   Supine Protraction 15x2  1#   2/20 TC needed for proper execution; inc reps   Rhythmic stabilization: IR/ER at side  2x1 min       Rhythmic stabilization at 90 deg flex  2x1 min       AROM shoulder flexion

## 2025-03-27 ENCOUNTER — HOSPITAL ENCOUNTER (OUTPATIENT)
Dept: PHYSICAL THERAPY | Age: 80
Setting detail: THERAPIES SERIES
Discharge: HOME | End: 2025-03-27
Attending: ORTHOPAEDIC SURGERY
Payer: MEDICARE

## 2025-03-27 PROCEDURE — 97110 THERAPEUTIC EXERCISES: CPT

## 2025-03-27 NOTE — FLOWSHEET NOTE
[facility]   Outpatient Rehabilitation & Therapy  [address]  P: [phone]   F: [phone]    Physical Therapy Daily Treatment Note    Date:  3/27/2025  Patient Name:  Aster Edward    :  1945  MRN: [id number]  Physician:      Melissa Fisher MD                                    Insurance: [employer] medicare - 18 visits 25-25  Medical Diagnosis: S43.014D (ICD-10-CM) - Anterior dislocation of right shoulder, subsequent encounter             Rehab Codes: R25 pain , M25.60 stiffness, R53.1 weakness   Onset Date: 24                        Next 's appt: TBD -- 25- Dr. Noonan   Visit# / total visits:   Cancels/No Shows: 0/0    Precautions: avoid \"cocked position for throwing\"  See Dr. Fisher's Anterior Shoulder Dislocation/Subluxation Rehab guidelines -- beginning phase I (in soft chart)     Subjective:   Patient reporting she continues to note pain after PT.  Patient reporting she has been compliant with HEP.  Patient continues to note difficulty with picking up something weighted and cutting when cooking.      Pain:  [] Yes  [x] No Location:  denies pain  Pain Rating: (0-10 scale) 0/10;   Pain altered Tx:  [] No  [] Yes  Action:  Comments:    Objective:  Modalities:   Precautions:  Exercises:  INTERVENTIONS  Reps/ Time Weight/ Level Completed  Today Comments               MODALITIES                                    MANUAL                                    EXERCISES            Supine       Shoulder PROM 10x 5\"    ABD and flexion   AAROM Flexion  10x2 3\"  1#  2 added weight  18 monitor as the patient tends to elevate UT-- inc sets for proper technique   AAROM Chest Press 15x2  1#  2/20 inc reps   Supine Protraction 15x2  1#   2/20 TC needed for proper execution; inc reps   Rhythmic stabilization: IR/ER at side  2x1 min       Rhythmic stabilization at 90 deg flex  2x1 min       AROM shoulder flexion 10x             Seated       UBE: F/B  2 min ea  Lvl  4

## 2025-04-01 ENCOUNTER — HOSPITAL ENCOUNTER (OUTPATIENT)
Dept: PHYSICAL THERAPY | Age: 80
Setting detail: THERAPIES SERIES
Discharge: HOME | End: 2025-04-01
Attending: ORTHOPAEDIC SURGERY
Payer: MEDICARE

## 2025-04-01 PROCEDURE — 97110 THERAPEUTIC EXERCISES: CPT

## 2025-04-01 NOTE — THERAPY DISCHARGE
[facility]   Outpatient Rehabilitation & Therapy  [address] [facility] [address]  P: [phone]   F: [phone]    Physical Therapy Daily Treatment Note/Progress Note /Therapy Discharge     Date:  2025  Patient Name:  Aster Edward    :  1945  MRN: [id number]  Physician:      Melissa Fisher MD                                    Insurance: [employer] medicare - 18 visits 25-25  Medical Diagnosis: S43.014D (ICD-10-CM) - Anterior dislocation of right shoulder, subsequent encounter             Rehab Codes: R25 pain , M25.60 stiffness, R53.1 weakness   Onset Date: 24                        Next 's appt: TBD -- 25- Dr. Noonan   Visit# / total visits:   Cancels/No Shows: 0/0  Date of initial visit: 25        Date of PN: 25 (visit 9); 25 (visit 18)     Formal progress note reporting period:   25 - 25       Precautions: avoid \"cocked position for throwing\"  See Dr. Fisher's Anterior Shoulder Dislocation/Subluxation Rehab guidelines -- beginning phase I (in soft chart)     Subjective:   Patient notes over the course of PT she has been able to do more around the home and driving with less pain. She denies any sharp pain but still gets fatigue and soreness. She still finds difficulty with running the sweeper, lifting heavier objects, and raising the arm overhead. She notes she does her exercises but has not recently due to her  being in the hospital.     Pain:  [] Yes  [x] No Location:  denies pain  Pain Rating: (0-10 scale) 0/10;   Pain altered Tx:  [] No  [] Yes  Action:  Comments:    Objective:  Modalities:   Precautions:  Exercises:  INTERVENTIONS  Reps/ Time Weight/ Level Completed  Today Comments               MODALITIES                                    MANUAL                                    EXERCISES            Supine       Shoulder PROM 10x 5\"    ABD and flexion   AAROM Flexion  10x2 3\"  1#   added weight   monitor as the patient tends

## 2025-06-29 ENCOUNTER — TRANSCRIBE ORDERS (OUTPATIENT)
Dept: ADMINISTRATIVE | Age: 80
End: 2025-06-29

## 2025-06-29 DIAGNOSIS — M17.0 PRIMARY OSTEOARTHRITIS OF BOTH KNEES: Primary | ICD-10-CM

## 2025-07-02 ENCOUNTER — HOSPITAL ENCOUNTER (OUTPATIENT)
Dept: MRI IMAGING | Age: 80
Discharge: HOME | End: 2025-07-04
Attending: INTERNAL MEDICINE
Payer: MEDICARE

## 2025-07-02 DIAGNOSIS — M17.0 PRIMARY OSTEOARTHRITIS OF BOTH KNEES: ICD-10-CM

## 2025-07-02 PROCEDURE — 73721 MRI JNT OF LWR EXTRE W/O DYE: CPT

## (undated) DEVICE — ANGIOGRAPHIC CATHETER: Brand: EXPO™

## (undated) DEVICE — GUIDEWIRE 35/260/FC/PTFE/3J: Brand: GUIDEWIRE

## (undated) DEVICE — INTRODUCER SHTH 6FR L11CM 0.038IN STD SIDEPRT EXTN 3 W

## (undated) DEVICE — SURGICAL PROCEDURE TRAY CRD CATH SVMMC

## (undated) DEVICE — GLIDESHEATH SLENDER STAINLESS STEEL KIT: Brand: GLIDESHEATH SLENDER

## (undated) DEVICE — BAND COMPR L24CM REG CLR PLAS HEMSTAT EXT HK AND LOOP RETEN